# Patient Record
Sex: FEMALE | Race: WHITE | NOT HISPANIC OR LATINO | Employment: OTHER | URBAN - METROPOLITAN AREA
[De-identification: names, ages, dates, MRNs, and addresses within clinical notes are randomized per-mention and may not be internally consistent; named-entity substitution may affect disease eponyms.]

---

## 2023-05-10 LAB
ALPHA-1 FETOPROTEIN (NG/ML) IN SER/PLAS: <4 NG/ML (ref 0–9)
CHOLESTEROL (MG/DL) IN SER/PLAS: 116 MG/DL (ref 0–199)
CHOLESTEROL IN HDL (MG/DL) IN SER/PLAS: 58.4 MG/DL
CHOLESTEROL/HDL RATIO: 2
LDL: 39 MG/DL (ref 0–99)
TRIGLYCERIDE (MG/DL) IN SER/PLAS: 91 MG/DL (ref 0–149)
VLDL: 18 MG/DL (ref 0–40)

## 2023-05-11 LAB
ALBUMIN (MG/L) IN URINE: 8.1 MG/L
ALBUMIN/CREATININE (UG/MG) IN URINE: 10.4 UG/MG CRT (ref 0–30)
AMORPHOUS CRYSTALS, URINE: ABNORMAL /HPF
APPEARANCE, URINE: ABNORMAL
BACTERIA, URINE: ABNORMAL /HPF
BILIRUBIN, URINE: NEGATIVE
BLOOD, URINE: NEGATIVE
CALCIUM OXALATE CRYSTALS, URINE: ABNORMAL /HPF
COLOR, URINE: YELLOW
CREATININE (MG/DL) IN URINE: 78.2 MG/DL (ref 20–320)
GLUCOSE, URINE: NEGATIVE MG/DL
KETONES, URINE: NEGATIVE MG/DL
LEUKOCYTE ESTERASE, URINE: ABNORMAL
MUCUS, URINE: ABNORMAL /LPF
NITRITE, URINE: NEGATIVE
PH, URINE: 6 (ref 5–8)
PROTEIN, URINE: NEGATIVE MG/DL
RBC, URINE: ABNORMAL /HPF (ref 0–5)
SPECIFIC GRAVITY, URINE: 1.01 (ref 1–1.03)
SQUAMOUS EPITHELIAL CELLS, URINE: 4 /HPF
UROBILINOGEN, URINE: 2 MG/DL (ref 0–1.9)
WBC, URINE: ABNORMAL /HPF (ref 0–5)

## 2023-08-24 ENCOUNTER — HOSPITAL ENCOUNTER (OUTPATIENT)
Dept: DATA CONVERSION | Facility: HOSPITAL | Age: 73
End: 2023-08-24

## 2023-08-24 DIAGNOSIS — C7A.8 OTHER MALIGNANT NEUROENDOCRINE TUMORS (MULTI): ICD-10-CM

## 2023-08-30 ENCOUNTER — HOSPITAL ENCOUNTER (OUTPATIENT)
Dept: DATA CONVERSION | Facility: HOSPITAL | Age: 73
Discharge: HOME | End: 2023-08-30
Payer: MEDICARE

## 2023-08-30 DIAGNOSIS — R91.8 OTHER NONSPECIFIC ABNORMAL FINDING OF LUNG FIELD: ICD-10-CM

## 2023-08-30 DIAGNOSIS — C7A.8 OTHER MALIGNANT NEUROENDOCRINE TUMORS (MULTI): ICD-10-CM

## 2023-12-14 ENCOUNTER — APPOINTMENT (OUTPATIENT)
Dept: HEMATOLOGY/ONCOLOGY | Facility: CLINIC | Age: 73
End: 2023-12-14
Payer: MEDICARE

## 2023-12-28 ENCOUNTER — LAB (OUTPATIENT)
Dept: LAB | Facility: LAB | Age: 73
End: 2023-12-28
Payer: MEDICARE

## 2023-12-28 DIAGNOSIS — K74.69 OTHER CIRRHOSIS OF LIVER (MULTI): Primary | ICD-10-CM

## 2023-12-28 LAB
ALBUMIN SERPL-MCNC: 3.2 G/DL (ref 3.5–5)
ALP BLD-CCNC: 159 U/L (ref 35–125)
ALT SERPL-CCNC: 17 U/L (ref 5–40)
ANION GAP SERPL CALC-SCNC: 11 MMOL/L
AST SERPL-CCNC: 28 U/L (ref 5–40)
BILIRUB SERPL-MCNC: 1.9 MG/DL (ref 0.1–1.2)
BUN SERPL-MCNC: 19 MG/DL (ref 8–25)
CALCIUM SERPL-MCNC: 9.8 MG/DL (ref 8.5–10.4)
CHLORIDE SERPL-SCNC: 105 MMOL/L (ref 97–107)
CO2 SERPL-SCNC: 25 MMOL/L (ref 24–31)
CREAT SERPL-MCNC: 0.8 MG/DL (ref 0.4–1.6)
ERYTHROCYTE [DISTWIDTH] IN BLOOD BY AUTOMATED COUNT: 15 % (ref 11.5–14.5)
GFR SERPL CREATININE-BSD FRML MDRD: 77 ML/MIN/1.73M*2
GLUCOSE SERPL-MCNC: 150 MG/DL (ref 65–99)
HCT VFR BLD AUTO: 45.2 % (ref 36–46)
HGB BLD-MCNC: 13.9 G/DL (ref 12–16)
INR PPP: 1.2 (ref 0.9–1.2)
MCH RBC QN AUTO: 30.6 PG (ref 26–34)
MCHC RBC AUTO-ENTMCNC: 30.8 G/DL (ref 32–36)
MCV RBC AUTO: 100 FL (ref 80–100)
NRBC BLD-RTO: 0 /100 WBCS (ref 0–0)
PLATELET # BLD AUTO: 70 X10*3/UL (ref 150–450)
POTASSIUM SERPL-SCNC: 3.5 MMOL/L (ref 3.4–5.1)
PROT SERPL-MCNC: 6.1 G/DL (ref 5.9–7.9)
PROTHROMBIN TIME: 12.2 SECONDS (ref 9.3–12.7)
RBC # BLD AUTO: 4.54 X10*6/UL (ref 4–5.2)
SODIUM SERPL-SCNC: 141 MMOL/L (ref 133–145)
WBC # BLD AUTO: 3.2 X10*3/UL (ref 4.4–11.3)

## 2023-12-28 PROCEDURE — 82105 ALPHA-FETOPROTEIN SERUM: CPT

## 2023-12-28 PROCEDURE — 85027 COMPLETE CBC AUTOMATED: CPT

## 2023-12-28 PROCEDURE — 85610 PROTHROMBIN TIME: CPT

## 2023-12-28 PROCEDURE — 36415 COLL VENOUS BLD VENIPUNCTURE: CPT

## 2023-12-28 PROCEDURE — 80053 COMPREHEN METABOLIC PANEL: CPT

## 2023-12-29 LAB — AFP SERPL-MCNC: <4 NG/ML (ref 0–9)

## 2023-12-31 RX ORDER — TRIAMTERENE AND HYDROCHLOROTHIAZIDE 75; 50 MG/1; MG/1
1 TABLET ORAL
Qty: 90 TABLET | OUTPATIENT
Start: 2023-12-31

## 2024-01-02 PROBLEM — K76.6 PORTAL HYPERTENSION (MULTI): Status: ACTIVE | Noted: 2024-01-02

## 2024-01-02 PROBLEM — R16.1 SPLENOMEGALY: Status: ACTIVE | Noted: 2024-01-02

## 2024-01-02 PROBLEM — M85.80 OSTEOPENIA: Status: ACTIVE | Noted: 2024-01-02

## 2024-01-02 PROBLEM — I10 HTN (HYPERTENSION): Status: ACTIVE | Noted: 2024-01-02

## 2024-01-02 PROBLEM — K74.3 PRIMARY BILIARY CIRRHOSIS (MULTI): Status: ACTIVE | Noted: 2024-01-02

## 2024-01-02 PROBLEM — C7B.09: Status: ACTIVE | Noted: 2024-01-02

## 2024-01-02 PROBLEM — E78.5 HYPERLIPIDEMIA: Status: ACTIVE | Noted: 2024-01-02

## 2024-01-02 NOTE — PROGRESS NOTES
HPI:       Hypertension:          The patient has no issues         The patients cardiovascular risk factors include:         Cardiac Risk Factors  Age > 45-male, > 55-female:  YES  +1   Smoking:   NO   Sig. family hx of CHD*:  YES  +1   Hypertension:   YES  +1   Diabetes:   NO   HDL < 35:   NO   HDL > 59:   NO   Total: 3     *- Sig. family h/o CHD per NCEP = MI or sudden death at <56yo in   father or other 1st-degree male relative, or <64yo in mother or   other 1st-degree female relative           The patients adherence to the treatment regimen is Excellent         Responses to the medications has been Excellent    Hyperlipidemia:   The patient does not use medications that may worsen dyslipidemias (corticosteroids, progestins, anabolic steroids, diuretics, beta-blockers, amiodarone, cyclosporine, olanzapine). The patient exercises intermittently.  The patient is not known to have coexisting coronary artery disease.      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      ferrous sulfate 325 (65 Fe) MG EC tablet once every 24 hours.      ursodiol (Actigall) 500 mg tablet Take 1 tablet (500 mg) by mouth 2 times a day.      vit A/vit C/vit E/zinc/copper (ICAPS AREDS ORAL) once every 24 hours.      potassium chloride CR 10 mEq ER tablet Take 1 tablet (10 mEq) by mouth 2 times a day. 180 tablet 1     triamterene-hydrochlorothiazid (Maxzide) 75-50 mg tablet Take 1 tablet by mouth once daily in the morning. Take before meals. 90 tablet 1     No current facility-administered medications for this visit.     ALLERGIES: No Known Allergies  FAMILY HISTORY:   Family History   Problem Relation Name Age of Onset    Ovarian cancer Mother      Hypertension Mother      Coronary artery disease Father      Hypertension Father      Hypertension Sister      No Known Problems Sister      Hypertension Brother       SOCIAL HISTORY:   Social History     Tobacco Use    Smoking status: Former     Types: Cigarettes     Passive exposure: Past    Smokeless tobacco: Never   Vaping Use    Vaping Use: Never used   Substance Use Topics    Alcohol use: Never         ROS:      CONSTITUTION:  alert and oriented     OPHTHALMOLOGY:          no Change in vision.         CARDIOLOGY:          no Chest pain.  no Dyspnea on exertion.  no Shortness of breath.         ENDOCRINOLOGY:          no Excessive thirst.  no Excessive urination.  no Fatigue.  no Skin changes.  no Weight gain.  no Weight loss.         SKIN:          no Healing problems.         NEUROLOGY:          no Loss of sensation in specific body area.  no Burning pain in feet.  no Peripheral neuropathy.  no Tingling/numbness.    LABS:   Lab Results   Component Value Date    GLUCOSE 150 (H) 12/28/2023    CALCIUM 9.8 12/28/2023     12/28/2023    K 3.5 12/28/2023    CO2 25 12/28/2023     12/28/2023    BUN 19 12/28/2023    CREATININE 0.80 12/28/2023     Lab Results   Component Value Date    CHOL 116 05/10/2023    CHOL 109 (L) 10/24/2022    CHOL 119 (L) 10/14/2021     Lab Results   Component Value Date    HDL 58.4 05/10/2023    HDL 56 10/24/2022    HDL 60 10/14/2021     Lab Results   Component Value Date    LDLCALC 30 (L) 10/24/2022    LDLCALC 40 (L) 10/14/2021    LDLCALC 55 (L) 10/20/2020     Lab Results   Component Value Date    TRIG 91 05/10/2023    TRIG 117 10/24/2022    TRIG 94  "10/14/2021     No components found for: \"CHOLHDL\"  Lab Results   Component Value Date    SHARATH Aguayo 10/24/2022          VITAL SIGNS:  /70 (BP Location: Left arm)   Pulse 73   Wt 95.6 kg (210 lb 12.8 oz)   SpO2 98%   BMI 35.08 kg/m²     General Appearance: awake, alert, oriented, in no acute distress  Lungs: Normal expansion.  Clear to auscultation.  No rales, rhonchi, or wheezing.  Heart: Heart sounds are normal.  Regular rate and rhythm without murmur, gallop or rub.  Extremities: Extremities warm to touch, pink, with no edema.  Neurologic: Alert and oriented x 3, gait normal., reflexes normal and symmetric, strength and  sensation grossly normal    Jessi was seen today for hypertension.  Diagnoses and all orders for this visit:  Primary hypertension (Primary)  -     potassium chloride CR 10 mEq ER tablet; Take 1 tablet (10 mEq) by mouth 2 times a day.  -     triamterene-hydrochlorothiazid (Maxzide) 75-50 mg tablet; Take 1 tablet by mouth once daily in the morning. Take before meals.  -     Follow Up In Primary Care - Established; Future  Mixed hyperlipidemia  Comments:  cont diet and exercise           "

## 2024-01-03 RX ORDER — POTASSIUM CHLORIDE 750 MG/1
10 TABLET, EXTENDED RELEASE ORAL 2 TIMES DAILY
COMMUNITY
Start: 2023-10-17 | End: 2024-01-04 | Stop reason: SDUPTHER

## 2024-01-03 RX ORDER — URSODIOL 500 MG/1
500 TABLET, FILM COATED ORAL 2 TIMES DAILY
COMMUNITY

## 2024-01-03 RX ORDER — FERROUS SULFATE 325(65) MG
TABLET, DELAYED RELEASE (ENTERIC COATED) ORAL EVERY 24 HOURS
COMMUNITY

## 2024-01-03 RX ORDER — VIT C/E/ZN/COPPR/LUTEIN/ZEAXAN 250MG-90MG
1 CAPSULE ORAL EVERY 24 HOURS
COMMUNITY

## 2024-01-03 RX ORDER — TRIAMTERENE AND HYDROCHLOROTHIAZIDE 75; 50 MG/1; MG/1
1 TABLET ORAL
COMMUNITY
End: 2024-01-04 | Stop reason: SDUPTHER

## 2024-01-04 ENCOUNTER — OFFICE VISIT (OUTPATIENT)
Dept: PRIMARY CARE | Facility: CLINIC | Age: 74
End: 2024-01-04
Payer: MEDICARE

## 2024-01-04 VITALS
SYSTOLIC BLOOD PRESSURE: 136 MMHG | HEART RATE: 73 BPM | BODY MASS INDEX: 35.08 KG/M2 | OXYGEN SATURATION: 98 % | DIASTOLIC BLOOD PRESSURE: 70 MMHG | WEIGHT: 210.8 LBS

## 2024-01-04 DIAGNOSIS — E78.2 MIXED HYPERLIPIDEMIA: ICD-10-CM

## 2024-01-04 DIAGNOSIS — I10 PRIMARY HYPERTENSION: Primary | ICD-10-CM

## 2024-01-04 PROCEDURE — 1125F AMNT PAIN NOTED PAIN PRSNT: CPT | Performed by: FAMILY MEDICINE

## 2024-01-04 PROCEDURE — 1159F MED LIST DOCD IN RCRD: CPT | Performed by: FAMILY MEDICINE

## 2024-01-04 PROCEDURE — 3075F SYST BP GE 130 - 139MM HG: CPT | Performed by: FAMILY MEDICINE

## 2024-01-04 PROCEDURE — 1036F TOBACCO NON-USER: CPT | Performed by: FAMILY MEDICINE

## 2024-01-04 PROCEDURE — 3078F DIAST BP <80 MM HG: CPT | Performed by: FAMILY MEDICINE

## 2024-01-04 PROCEDURE — 99214 OFFICE O/P EST MOD 30 MIN: CPT | Performed by: FAMILY MEDICINE

## 2024-01-04 RX ORDER — TRIAMTERENE AND HYDROCHLOROTHIAZIDE 75; 50 MG/1; MG/1
1 TABLET ORAL
Qty: 90 TABLET | Refills: 1 | Status: SHIPPED | OUTPATIENT
Start: 2024-01-04 | End: 2024-07-02

## 2024-01-04 RX ORDER — POTASSIUM CHLORIDE 750 MG/1
10 TABLET, FILM COATED, EXTENDED RELEASE ORAL 2 TIMES DAILY
Qty: 180 TABLET | Refills: 1 | Status: SHIPPED | OUTPATIENT
Start: 2024-01-04 | End: 2024-07-02

## 2024-01-04 ASSESSMENT — PAIN SCALES - GENERAL: PAINLEVEL: 2

## 2024-01-04 ASSESSMENT — PATIENT HEALTH QUESTIONNAIRE - PHQ9: 1. LITTLE INTEREST OR PLEASURE IN DOING THINGS: NOT AT ALL

## 2024-01-04 NOTE — PATIENT INSTRUCTIONS
PAIN MANAGEMENT IN THE ELDERLY  What is pain? Pain is how your body reacts to injury or illness, even as you get older. Pain is not something that normally happens as you age. What you think is painful may not be painful to someone else. Everybody reacts to pain in different ways. But, pain is whatever you say it is! No matter how mild or strong your pain is, you have the right to be “comfortable”. But you need to tell your caregiver that you have pain, so together you can work on treatment options.   The following are some things that are NOT true about getting older and pain.  I should expect to have pain because it is just a part of getting older.  If I tell my caregiver about my pain, he will think I am complaining. You have the right just as anyone else does to be comfortable. Your caregiver cannot treat your pain if you do not tell him about it.  If I tell my caregiver about my pain he will not listen or take me seriously.  If I take pain medicines, I will feel “drugged up” or “doped up”. There are many new medicines now that lessen pain without making you feel “drugged up” when taking them.  Older people should only take pain medicines if their pain is very bad. You have the right to be comfortable no matter if you have a little or a lot of pain.  Pain medicines are addictive and if I take them I will get “hooked”. An addicted person is someone who takes medicines to “get high”. You cannot get addicted to pain medicines if you are taking them to make your pain go away. A person who has diabetes takes their medicine to keep the diabetes I control. It is the same with your pain. You take pain medicines to keep your pain in control and to live a normal life.  What causes pain? Pain can be caused by many things, such as injury, surgery, or disease. Some pain is caused by pressure on a nerve, such as a cancerous tumor. Other pain is caused when nerves are cut as I an accident or surgery. Old injuries that may not  have bothered you may get re?injured, or cause new injuries. You may not want to move the painful part of your body at all. But, you may have pain because you are not moving this body part. But sometimes there is no clear cause for pain.    What are the different types of pain?  Acute pain is short?lived and usually lasts less than 3 months. Caregivers first work to remove the cause of the pain, such fixing a broken arm or leg. Acute pain can usually be controlled or stopped with pain medicine.  Chronic pain lasts longer than 3 to 6 months. Almost 4 out of 5 persons over 65 regularly take medications for chronic pain, and half of this group has seen more than 3 doctors in the past 5 years. This kind of pain is often more complex. Caregivers may use medicines along with other treatments, like physical and relaxation therapies to help your pain.    What is your pain like? Caregivers want you to talk to them about your pain. This helps them learn what may be causing the pain and how to best treat it. You need to tell your caregivers if you have trouble hearing their questions or seeing things. Caregivers can use special tools and ways to better understand their questions about your pain.    What if I cannot talk? Sometimes you may not be able to speak about your pain. You may have illness or injuries like dementia, brain damage, or a stroke. This makes it very hard for your caregivers and family to know you are in pain. Your family may help caregivers understand your pain by watching for physical signs of pain. This means you may act normal or opposite of how your family thinks you should act even though you are having very bad pain.     The following are some signs that your family can watch for that may tell them you are in pain.  If you are normally loud and noisy, you may get very quiet and withdrawn. You may also stop doing activities you used to do.  If you are very quiet and withdrawn, you may get loud, act  stubborn, and hit people.  You may not eat what you normally do. Or, you may only want to drink or eat soft foods.  Suddenly, you may not do all the activities you used to do.  You may lose control of your bowel or bladder.  You may be very depressed and have a sad face.  If you do not talk at all, you may blink your eyes very fast much of the time. You may also grimace.  If you have been very easy going and happy, you may begin to be very sensitive and cry easily.  You might start to walk or move differently than before. You may suddenly stop walking or start pacing all the time.  You may have your knees drawn up to your chest and rock like a baby.  You may touch, rub, pull or pick at a body part that is hurting.  You may start to pull away from peoples’ touch and protect your arms and legs.  You may suddenly begin to fall when you had no problems before.  You may sleep more than usual.  You may start to whimper or groan quietly.  You may become very confused suddenly when there was no problem before.  Why is pain control important? Pain can affect your appetite, how well you sleep, your energy level and ability to do things. Pain can also affect your mood and relationship with others. If caregivers can help you control your pain, you will suffer less and can even heal faster.  Medicines:  Anti?anxiety medicine: this medicine may be given to help you feel less nervous. It may be given by IV, as a shot/injection or by mouth.  Anti?convulsing: this medicine is given to control seizures. It can also be used to treat kinds of chronic nerve pain and may help control your mood swings. It is given by IV, shot/injection or by mouth.  Muscle relaxers: you may need medicine to help your muscles relax. This medicine can be given by IV, shot/injection or by mouth. Muscle relaxers can make you feel dizzy or weak. Call your caregiver if you need help getting out of bed.  NSAIDS: this medicine may be given to decrease  inflammation, which is redness, pain and swelling. It is very good for chronic bone pain that comes from arthritis or cancer. It is given by mouth or inhaled in your nose.  Pain medicine: this medicine affects the nervous system so you feel less pain. Your caregiver will tell you how much to take and how often. Take the medicine regularly as directed by your  caregiver. Do not wait until the pain is too bad. The medicine may not work as well at controlling the pain if you wait too long to take it. Tell caregivers if the pain does not go away or comes back.  Steroids: this medicine may be given to decrease inflammation. There are many different reasons to take steroids. This medication can help a lot but may also have side effects. Be sure  you understand why you need steroids. Don’t stop taking this medicine without your caregiver’s ok. Stopping on your own can cause problems.  Tell your caregiver all medications that you are taking, including over the counter meds and herbals.    How can pain medicine be given? The following are the many different ways pain medicine can be given depending on the kind of pain you are having.  By mouth - you may be given pills or liquid to swallow or you may be given a pill or liquid to put under your tongue. Some medicine can also be given as a lozenge like a cough drop or even as a special lollipop.  Rectal - medicine in a suppository is put into your rectum.  IV - pain medicine can be given as a shot/injection in an IV, into a muscle, or under the skin.  Transdermal - some medicine can be given as a patch that is placed on your skin. This medicine is released slowly to give pain relief for as long as 72 hours.  How can you take pain medicine safely and make it work best for you?  DO NOT wait until you are in pain to take your medicine if your caregiver has suggested a regular schedule around the clock. If you wait until you feel pain, you will only be “chasing” your pain and not  controlling it.  Some pain medicines can make you breathe less deeply and less often. The medicine may also make you sleepy, dizzy, and unsafe to drive a car or use heavy equipment. For these reasons, it is very important to follow your caregiver’s advice on how to use this medicine.  Some foods, alcohol and other medicines may cause unpleasant side effects when you take pain medicine. Follow your caregiver’s advice about how to prevent these problems.  Pain medicine and NSAIDS can make you constipated. Contact your caregiver if you are experiencing constipation.  Do not stop talking pain medicine suddenly if you have been taking it longer than 2 weeks. Your body may have become used to the medicine. Stopping the medicine all at once may cause unpleasant or dangerous side effects.  With time you may feel that the pain medicine is not working as well as it did before. Call your caregiver if this happens. Together you can find new ways to control the pain.    Other non?drug control methods: there are many pain control techniques that can help you deal with pain even if it does not go away completely. It is important to practice some of the techniques whe you do not have pain if possible. This will help the technique work well during an attack of pain.  Breathing exercises.  Environment: being in a quiet place may make it easier for you to deal with the pain. Avoiding bright lights or loud, noisy places can also help control the pain. Making sure your home is not too hot or too cold may also lessen pain.

## 2024-01-13 DIAGNOSIS — I10 PRIMARY HYPERTENSION: ICD-10-CM

## 2024-01-13 RX ORDER — POTASSIUM CHLORIDE 750 MG/1
10 TABLET, EXTENDED RELEASE ORAL 2 TIMES DAILY
Qty: 180 TABLET | Refills: 1 | OUTPATIENT
Start: 2024-01-13

## 2024-04-23 ENCOUNTER — PREP FOR PROCEDURE (OUTPATIENT)
Dept: OBSTETRICS AND GYNECOLOGY | Facility: CLINIC | Age: 74
End: 2024-04-23

## 2024-04-23 ENCOUNTER — OFFICE VISIT (OUTPATIENT)
Dept: OBSTETRICS AND GYNECOLOGY | Facility: CLINIC | Age: 74
End: 2024-04-23
Payer: MEDICARE

## 2024-04-23 VITALS
SYSTOLIC BLOOD PRESSURE: 134 MMHG | DIASTOLIC BLOOD PRESSURE: 78 MMHG | WEIGHT: 209 LBS | BODY MASS INDEX: 34.82 KG/M2 | HEIGHT: 65 IN

## 2024-04-23 DIAGNOSIS — N81.4 UTEROVAGINAL PROLAPSE: Primary | ICD-10-CM

## 2024-04-23 DIAGNOSIS — N39.3 SUI (STRESS URINARY INCONTINENCE, FEMALE): ICD-10-CM

## 2024-04-23 DIAGNOSIS — K62.3 RECTAL PROLAPSE: ICD-10-CM

## 2024-04-23 DIAGNOSIS — Z01.818 PREOP TESTING: ICD-10-CM

## 2024-04-23 DIAGNOSIS — N39.46 MIXED STRESS AND URGE INCONTINENCE: ICD-10-CM

## 2024-04-23 DIAGNOSIS — N81.4 UTERINE PROLAPSE: ICD-10-CM

## 2024-04-23 LAB
POC APPEARANCE, URINE: ABNORMAL
POC BILIRUBIN, URINE: NEGATIVE
POC BLOOD, URINE: NEGATIVE
POC COLOR, URINE: YELLOW
POC GLUCOSE, URINE: NEGATIVE MG/DL
POC KETONES, URINE: NEGATIVE MG/DL
POC LEUKOCYTES, URINE: NEGATIVE
POC NITRITE,URINE: NEGATIVE
POC PH, URINE: 7.5 PH
POC PROTEIN, URINE: NEGATIVE MG/DL
POC SPECIFIC GRAVITY, URINE: 1.01
POC UROBILINOGEN, URINE: 0.2 EU/DL

## 2024-04-23 PROCEDURE — 51701 INSERT BLADDER CATHETER: CPT | Performed by: OBSTETRICS & GYNECOLOGY

## 2024-04-23 PROCEDURE — 81003 URINALYSIS AUTO W/O SCOPE: CPT | Performed by: OBSTETRICS & GYNECOLOGY

## 2024-04-23 PROCEDURE — 3078F DIAST BP <80 MM HG: CPT | Performed by: OBSTETRICS & GYNECOLOGY

## 2024-04-23 PROCEDURE — 99205 OFFICE O/P NEW HI 60 MIN: CPT | Performed by: OBSTETRICS & GYNECOLOGY

## 2024-04-23 PROCEDURE — 1159F MED LIST DOCD IN RCRD: CPT | Performed by: OBSTETRICS & GYNECOLOGY

## 2024-04-23 PROCEDURE — 3075F SYST BP GE 130 - 139MM HG: CPT | Performed by: OBSTETRICS & GYNECOLOGY

## 2024-04-23 RX ORDER — VIT A/VIT C/VIT E/ZINC/COPPER 4296-226
CAPSULE ORAL EVERY 24 HOURS
COMMUNITY
End: 2024-05-02 | Stop reason: WASHOUT

## 2024-04-23 RX ORDER — HEPARIN SODIUM 5000 [USP'U]/ML
5000 INJECTION, SOLUTION INTRAVENOUS; SUBCUTANEOUS ONCE
Status: CANCELLED | OUTPATIENT
Start: 2024-04-23 | End: 2024-04-23

## 2024-04-23 RX ORDER — CEFAZOLIN SODIUM 2 G/100ML
2 INJECTION, SOLUTION INTRAVENOUS ONCE
Status: CANCELLED | OUTPATIENT
Start: 2024-04-23 | End: 2024-04-23

## 2024-04-23 RX ORDER — PHENAZOPYRIDINE HYDROCHLORIDE 200 MG/1
200 TABLET, FILM COATED ORAL ONCE
Status: CANCELLED | OUTPATIENT
Start: 2024-04-23 | End: 2024-04-23

## 2024-04-23 RX ORDER — SODIUM CHLORIDE, SODIUM LACTATE, POTASSIUM CHLORIDE, CALCIUM CHLORIDE 600; 310; 30; 20 MG/100ML; MG/100ML; MG/100ML; MG/100ML
100 INJECTION, SOLUTION INTRAVENOUS CONTINUOUS
Status: CANCELLED | OUTPATIENT
Start: 2024-04-23

## 2024-04-23 RX ORDER — ACETAMINOPHEN 325 MG/1
975 TABLET ORAL ONCE
Status: CANCELLED | OUTPATIENT
Start: 2024-04-23 | End: 2024-04-23

## 2024-04-23 RX ORDER — CELECOXIB 400 MG/1
400 CAPSULE ORAL ONCE
Status: CANCELLED | OUTPATIENT
Start: 2024-04-23 | End: 2024-04-23

## 2024-04-23 ASSESSMENT — ENCOUNTER SYMPTOMS
PSYCHIATRIC NEGATIVE: 1
NEUROLOGICAL NEGATIVE: 1
CONSTITUTIONAL NEGATIVE: 1
RESPIRATORY NEGATIVE: 1
ANAL BLEEDING: 1
CARDIOVASCULAR NEGATIVE: 1
EYES NEGATIVE: 1
CONSTIPATION: 1
MUSCULOSKELETAL NEGATIVE: 1
ENDOCRINE NEGATIVE: 1

## 2024-04-23 NOTE — PATIENT INSTRUCTIONS
She does not desire future coital function and therefore we discussed an obliterative procedure with LeFort colpocleisis and ultra-lateral perineorrhaphy versus ultra-lateral anterior and posterior repairs as indicated by surgical findings (90-95% success, <10% reoperation rate). She understands that following the surgery the vagina will be shortened and narrowed to a point that she will be unable to have conventional penetrative intercourse.     She also understands that her uterus will be left in place but difficult to access. There is a small risk of future development of uterine cancer and this procedure could complicate that assessment. She does / does not have a history of postmenopausal bleeding and does/ does not need additional evaluation of the uterus prior to surgery.

## 2024-04-23 NOTE — PROGRESS NOTES
[facility] Department of Urogynecology   Nichol Jackson MD, MPH   [phone]    ASSESSMENT AND PLAN:   74 year old female with rectal prolapse, stage 4 uterovaginal prolapse, and MARI with urge and stress. Comorbidities include: HTN, hx of lung cancer, biliary cirrhosis, cytopenia, and h/o splenomegaly.     Diagnoses:  #1 Uterovaginal prolapse, stage 4  #2 Rectal prolapse  #3 Mixed urinary incontinence, urge and stress    Plan:  1. Uterovaginal prolapse, stage 4  - PVR = 5mL by straight catheterization  - Upon exam her Pop-Q demonstrated Aa: +1, Ba: +2, C: +5, gH: 6, pB: 4, TVL: 10, Ap: -2, Bp: -2, and D: -5 indicating stage III-IV uterovaginal prolapse.   - Reviewed risk factors, natural history and etiology of prolapse.   - Discussed management options for prolapse including expectant management, pessary fitting, and surgery.   - We discussed the benefits of fitting her with a pessary which is a small flexible silicone ring that is placed intravaginally to help support the pelvic organs to prevent the symptomatic vaginal bulge. There is no increased risk of UTI or vaginal infections with using a pessary. She may learn how to manage the pessary at home on her own with taking the pessary in/out prior to intercourse and recommended maintenance at least every 2 weeks or she may return to clinic every 3 months for us to perform her pessary maintenance.   > She has previously tried a ring pessary that was fitted by Dr. Campbell and the pessary fell out.   > Declines a pessary refitting.   - We discussed there are different POP repair surgical approaches to consider given that is not interested in maintaining ability to be sexually active in the future such as the colpocleisis which is a vaginal closure procedure that involves suturing the front and back walls of the vagina. However, alternative more invasive POP repair options include laparoscopic abdominal surgery that involves placing polypropylene mesh  (i.e. SCP + ESTEVAN) or a native tissue repair vaginal approach that does not involve mesh but rather uses sutures with the intent of the body to scar over to suspend the pelvic organs that can be performed with or without a hysterectomy (i.e. SSLS +/- TVH and APR). For either surgery, it involves 6 weeks of postoperative pelvic rest and no heavy lifting > 5-10 pounds.   > She is interested in pursuing the colpocleisis and we gave her PI handout on this to review the literature.   - We counseled on the risks, surgical steps, benefits, expected outcomes, and postoperative recovery of the LeFort colpocleisis. The colpocleisis is suturing the front and back walls of the vagina together to prevent the prolapse from bulging out of the vagina past the introitus but we would leave two tracts from the cervix down the lateral sides of the vagina in case she were to ever develop vaginal bleeding/PMB in the future she would be symptomatic and we would able to evaluate it. She would not need a hysterectomy but we would order a pelvic U/S prior to the colpocleisis to evaluate the uterus prior to surgery. General risks of surgery include bleeding, infection, and damage to surrounding organs (i.e., bowel, bladder, ureters, and associated vasculature) if anything is damaged during surgery we will address it at that time. We specifically discussed that her anatomy will remain the same and her ability to defecate/urinate will remain intact but she will have a shortened vaginal length and will be unable to have penetrative intercourse after this procedure in the future. The benefits of this procedure is that it is quick with less time under anesthesia, less risks of damage to surrounding organs as we will not be operating abdominally, very low risk of infection and bleeding, and this procedure has a high success rate of around 95%.  - Ordered pelvic U/S to evaluate uterus and endometrial thickness prior to LeFort colpocleisis.  - Will  need PAT and possible oncology + hematology clearance given her blood clotting disorder and hx of lung cancer.   - Case request sent for LeFort colpocleisis, VA/perineorrhaphy, possible TVT or Bulkamid, and cystoscopy with Dr. Nichol Jackson @ [facility] [place].     2. MARI with urge and stress  - We discussed that in order to correctly treat her urinary issues she will need UDS to fully assess/diagnose the type of urinary problems she is having (i.e., LUMA vs. ISD vs. OAB). Urodynamics assesses bladder function and capacity, voiding/filling patterns, urethral strength, assess if there is PHAM, MUCP, etc.   - Counseled on the importance of UDS to evaluate if there will be occult LUMA after POP repair and if there is we would offer her an anti-incontinence procedure at the time of her POP repair (i.e. midurethral sling or Bulkamid).     3. Rectal prolapse, rectal bleeding   - Upon rectal exam today there was a 1cm of circumferential rectal prolapse and hemorrhoids that easily began to bleed when lightly palpated.   - Placed referral to colo/rectal for further management of rectal prolapse.     Follow up after UDS with Dr. Nichol Jackson for a phone call visit to discuss UDS results to finalize the surgical plan.     Scribe Attestation  By signing my name below, I, Ana Bradford, attest that this documentation has been prepared under the direction and in the presence of Nichol Jackson MD MPH on 04/23/2024 at 3:58 PM.     Problem List Items Addressed This Visit    None  Visit Diagnoses       Uterovaginal prolapse    -  Primary    Uterine prolapse        Relevant Orders    POCT UA Automated manually resulted (Completed)    Uroflowmetry    Preop testing        Relevant Orders    Uroflowmetry    US PELVIS TRANSABDOMINAL WITH TRANSVAGINAL    Mixed stress and urge incontinence        Relevant Orders    Uroflowmetry    Rectal prolapse        Relevant Orders    Referral to Colorectal Surgery           I spent a total of 60 minutes  in face to face and non face to face time.      Nichol Jackson MD, MPH, FACOG     I Dr. Jackson, personally performed the services described in the documentation as scribed in my presence and confirm it is both complete and accurate.  Nichol Jackson MD, MPH, FACOG      New    HISTORY OF PRESENT ILLNESS:   74 year old female presenting as a new patient referral from Dr. Campbell for evaluation of uterine prolapse and urinary incontinence.     Prolapse Symptoms:  - The patient reports new onset of feeling a vaginal bulge that began recently within the past x1 year where she noticed it with moving when picking up heavy boxes.   - She notices a vaginal bulge when she lifts heavy items.   - Her POP is large in size and comes past the vaginal introitus.      Urinary Symptoms:   - Endorses LUMA with lifting heavy objects and coughing.  - She reports wearing pads for insensory urinary incontinence when she notices a wet/saturated pad she changes her pad around 4-5x per day. Of note, insensory urine loss began around x4 weeks ago.   - Denies feeling of incomplete bladder emptying.   - Nocturia 2x/night and this does not negatively impact her sleep quality or cause insomnia/difficulty getting back to sleep.   - Voids every 2-3 hours during the day. Of note, she changes her pad with each void due to the pad being damp with urine.   - Does endorse occasional UUI with leaking on her way to the bathroom with associated urgency.   - No hx of Donna or pyelonephritis.   - Drinks 3 cups of water that she sips throughout the day, 1 small cup of tea in the morning, and 1 Diet Dr. Pepper (12oz) can per day.     Bowel Symptoms:   - Has a BM once every morning typically and stool consistency is soft/easy to pass after initial hard stool.   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   - Does endorse occasional rectal pain with bowel movements and states she bleeds easily due to her DHARMESH/autoimmune cirrhosis. She states she has a blood disorder described as having prolonged clotting times/thin blood discovered when she was a child. She no longer sees hematology.   - No FI or ABL episodes at baseline.     OBGYN History and Sexual Activity:   - , x4   - Does have a history of OASI with her first delivery.   - Denies having any PPH or excessive bleeding with any of her deliveries that required RBC transfusion.   - Menopause at age 38 or 39 and denies PMB.   - Not currently sexually active with her  due to male factor issues. She is not interested in becoming sexually active again in the future.        Past Medical History:     - HTN, hx of lung cancer with previous hx of smoking (quit > 10  years ago), biliary cirrhosis, cytopenia, and h/o splenomegaly.   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)      Espinoza; observation yearly; stable since 2017    Osteopenia     Portal hypertension (Multi)     Ocampo    Primary biliary cirrhosis (Multi)     Ocampo    Splenomegaly     Ocampo    Thyroid nodule     Shayla: benign bx's x 2       Past Surgical History:     Past Surgical History:   Procedure Laterality Date    CATARACT EXTRACTION Left 10/2022    COLONOSCOPY  2021    + polyps/nelida 5 yrs; [facility]    CT GUIDED PERCUTANEOUS BIOPSY LUNG  05/10/2021    + cancerous/carcinoid  Jared/Pete    ESOPHAGOGASTRODUODENOSCOPY  2021    eso varices; portal htn gastropathy; Ocampo    US GUIDED PERCUTANEOUS PLACEMENT  10/26/2018    US GUIDED PERCUTANEOUS PLACEMENT [facility]       Medications:     Prior to  "Admission medications    Medication Sig Start Date End Date Taking? Authorizing Provider   cholecalciferol (Vitamin D3) 25 MCG (1000 UT) capsule 1 capsule (25 mcg) once every 24 hours.    Historical Provider, MD   ferrous sulfate 325 (65 Fe) MG EC tablet once every 24 hours.    Historical Provider, MD   potassium chloride CR 10 mEq ER tablet Take 1 tablet (10 mEq) by mouth 2 times a day. 1/4/24 7/2/24  Angeles Whittaker MD   triamterene-hydrochlorothiazid (Maxzide) 75-50 mg tablet Take 1 tablet by mouth once daily in the morning. Take before meals. 1/4/24 7/2/24  Angeles Whittaker MD   ursodiol (Actigall) 500 mg tablet Take 1 tablet (500 mg) by mouth 2 times a day.    Historical Provider, MD   vit A/vit C/vit E/zinc/copper (ICAPS AREDS ORAL) once every 24 hours.    Historical Provider, MD   vitamins A,C,E-zinc-copper (ICaps AREDS) 4,296 mcg-226 mg-90 mg capsule once every 24 hours.    Historical Provider, MD        ROS  Review of Systems   Constitutional: Negative.    HENT: Negative.     Eyes: Negative.    Respiratory: Negative.     Cardiovascular: Negative.    Gastrointestinal:  Positive for anal bleeding and constipation.   Endocrine: Negative.    Genitourinary:  Positive for enuresis and urgency.   Musculoskeletal: Negative.    Neurological: Negative.    Psychiatric/Behavioral: Negative.            PHYSICAL EXAM:    /78   Ht 1.651 m (5' 5\")   Wt 94.8 kg (209 lb)   BMI 34.78 kg/m²   No LMP recorded. Patient is postmenopausal.    PVR = 5mL by straight catheterization     Declines chaperone for physical exam.      Well developed, well nourished, in no apparent distress.   Neurologic/Psychiatric:  Awake, Alert and Oriented times 3.  Affect normal.     GENITAL/URINARY:     External Genitalia:  The patient has normal appearing external genitalia, normal skenes and bartholins glands, and a normal hair distribution.  Her vulva is without lesions, erythema or discharge.  It is non-tender with appropriate sensation. "     Urethral Meatus:  Size normal, Location normal, Lesions absent, Prolapse absent.    Urethra:  Fullness absent, Masses absent. Negative CST in the spine position.     Bladder:  Fullness absent, Masses absent, Tenderness absent.    Vagina:  General appearance normal, Estrogen effect normal, Discharge absent, Lesions absent. Normal vaginal epithelium, hypoestrogenic.     Cervix: Normal, no discharge. Small cervical polyp, not actively bleeding.   Uterus:  normal size, mobile, and nontender    Anus/Perineum:  Normal perineum. Positive Dovetail sign, minimal squeeze.     Stress urinary incontinence not demonstrable.       Physical Exam  Genitourinary:      Vulva, bladder and urethral meatus normal.      Genitourinary Comments: There is around 1cm of circumferential rectal prolapse and hemorrhoids.       No vaginal discharge.      Anterior and apical vaginal prolapse present.     Mild vaginal atrophy present.       Right Adnexa: not tender and no mass present.     Left Adnexa: not tender and no mass present.     Cervical polyp present.      No cervical discharge.      Cervical exam comments: Small cervical polyp, not actively bleeding. .      Uterus is not enlarged, fixed or tender.      No urethral tenderness, mass or stress urinary incontinence with cough stress test present.   POP-Q measurements were:      Aa: +1, Ba: +5, C: +5     gH: 6, pB: 4, TVL: 10     Ap: -2, Bp: -2, D: -5     Pelvic exam was performed with patient in the lithotomy position.   Rectum:      External hemorrhoid present.      Rectal exam comments: Rectal prolapse is notable. .       She does not have myofascial tenderness on exam.   Her highest pain score on exam is 1     Rectal exam: There is around 1cm of circumferential rectal prolapse and hemorrhoids.       Data and DIAGNOSTIC STUDIES REVIEWED   Lab Results   Component Value Date    GLUCOSE 150 (H) 12/28/2023    CALCIUM 9.8 12/28/2023     12/28/2023    K 3.5 12/28/2023    CO2 25  12/28/2023     12/28/2023    BUN 19 12/28/2023    CREATININE 0.80 12/28/2023     Lab Results   Component Value Date    WBC 3.2 (L) 12/28/2023    HGB 13.9 12/28/2023    HCT 45.2 12/28/2023     12/28/2023    PLT 70 (L) 12/28/2023

## 2024-04-25 ENCOUNTER — PROCEDURE VISIT (OUTPATIENT)
Dept: OBSTETRICS AND GYNECOLOGY | Facility: CLINIC | Age: 74
End: 2024-04-25
Payer: MEDICARE

## 2024-04-25 DIAGNOSIS — N39.46 MIXED STRESS AND URGE INCONTINENCE: ICD-10-CM

## 2024-04-25 PROCEDURE — 51741 ELECTRO-UROFLOWMETRY FIRST: CPT | Performed by: OBSTETRICS & GYNECOLOGY

## 2024-04-25 PROCEDURE — 51797 INTRAABDOMINAL PRESSURE TEST: CPT | Performed by: OBSTETRICS & GYNECOLOGY

## 2024-04-25 PROCEDURE — 51729 CYSTOMETROGRAM W/VP&UP: CPT | Performed by: OBSTETRICS & GYNECOLOGY

## 2024-04-25 PROCEDURE — 51784 ANAL/URINARY MUSCLE STUDY: CPT | Performed by: OBSTETRICS & GYNECOLOGY

## 2024-04-25 PROCEDURE — 51798 US URINE CAPACITY MEASURE: CPT | Performed by: OBSTETRICS & GYNECOLOGY

## 2024-04-26 ENCOUNTER — HOSPITAL ENCOUNTER (OUTPATIENT)
Facility: HOSPITAL | Age: 74
Setting detail: OUTPATIENT SURGERY
End: 2024-04-26
Attending: OBSTETRICS & GYNECOLOGY | Admitting: OBSTETRICS & GYNECOLOGY
Payer: MEDICARE

## 2024-04-26 PROBLEM — N39.3 SUI (STRESS URINARY INCONTINENCE, FEMALE): Status: ACTIVE | Noted: 2024-04-23

## 2024-04-26 PROBLEM — N81.4 UTEROVAGINAL PROLAPSE: Status: ACTIVE | Noted: 2024-04-23

## 2024-04-26 NOTE — PROGRESS NOTES
Patient ID: Jessi Hallman is a 74 y.o. female.    Uroflowmetry    Date/Time: 4/25/2024 9:44 AM    Performed by: Kellie Carpenter MA  Authorized by: Nichol Jackson MD MPH    Procedure discussed: discussed risks, benefits and alternatives    Chaperone present: no    Timeout: timeout called immediately prior to procedure    Prep: patient was prepped and draped in usual sterile fashion    Position: sitting    Procedure Details     Procedure: CMG with UPP/voiding pressures, EMG, intra-abdominal voiding study and uroflow      CMG with UPP/Voiding Pressures Details:     First urge to void (mL): 123    Urgency to void (mL): 181    Bladder capacity (mL): 260    Uroflow Details:     Pre-void volume (mL): 124.5    Voiding duration (sec): 29.6    Average flow rate (mL/sec): 4.2    Max flow rate (mL/sec): 10.7    Voided urine (mL): 273    Residual urine (mL): 0    Post-Procedure Details     Outcome: patient tolerated procedure well with no complications      Additional Details      + stress incontinence         Urodynamics Evaluation Documentation      HPI/Indication for UDS: 74 year old female with rectal prolapse, stage 4 uterovaginal prolapse, and MARI with urge and stress. Comorbidities include: HTN, hx of lung cancer, biliary cirrhosis, cytopenia, and h/o splenomegaly.      Diagnoses:  #1 Uterovaginal prolapse, stage 4  #2 Rectal prolapse  #3 Mixed urinary incontinence, urge and stress     Plan:  1. Uterovaginal prolapse, stage 4  - PVR = 5mL by straight catheterization  - Upon exam her Pop-Q demonstrated Aa: +1, Ba: +2, C: +5, gH: 6, pB: 4, TVL: 10, Ap: -2, Bp: -2, and D: -5 indicating stage III-IV uterovaginal prolapse.   - Reviewed risk factors, natural history and etiology of prolapse.   - Discussed management options for prolapse including expectant management, pessary fitting, and surgery.   - We discussed the benefits of fitting her with a pessary which is a small flexible silicone ring that is placed intravaginally  to help support the pelvic organs to prevent the symptomatic vaginal bulge. There is no increased risk of UTI or vaginal infections with using a pessary. She may learn how to manage the pessary at home on her own with taking the pessary in/out prior to intercourse and recommended maintenance at least every 2 weeks or she may return to clinic every 3 months for us to perform her pessary maintenance.   > She has previously tried a ring pessary that was fitted by Dr. Campbell and the pessary fell out.   > Declines a pessary refitting.   - We discussed there are different POP repair surgical approaches to consider given that is not interested in maintaining ability to be sexually active in the future such as the colpocleisis which is a vaginal closure procedure that involves suturing the front and back walls of the vagina. However, alternative more invasive POP repair options include laparoscopic abdominal surgery that involves placing polypropylene mesh (i.e. SCP + ESTEVAN) or a native tissue repair vaginal approach that does not involve mesh but rather uses sutures with the intent of the body to scar over to suspend the pelvic organs that can be performed with or without a hysterectomy (i.e. SSLS +/- TVH and APR). For either surgery, it involves 6 weeks of postoperative pelvic rest and no heavy lifting > 5-10 pounds.   > She is interested in pursuing the colpocleisis and we gave her PI handout on this to review the literature.   - We counseled on the risks, surgical steps, benefits, expected outcomes, and postoperative recovery of the LeFort colpocleisis. The colpocleisis is suturing the front and back walls of the vagina together to prevent the prolapse from bulging out of the vagina past the introitus but we would leave two tracts from the cervix down the lateral sides of the vagina in case she were to ever develop vaginal bleeding/PMB in the future she would be symptomatic and we would able to evaluate it. She would not  need a hysterectomy but we would order a pelvic U/S prior to the colpocleisis to evaluate the uterus prior to surgery. General risks of surgery include bleeding, infection, and damage to surrounding organs (i.e., bowel, bladder, ureters, and associated vasculature) if anything is damaged during surgery we will address it at that time. We specifically discussed that her anatomy will remain the same and her ability to defecate/urinate will remain intact but she will have a shortened vaginal length and will be unable to have penetrative intercourse after this procedure in the future. The benefits of this procedure is that it is quick with less time under anesthesia, less risks of damage to surrounding organs as we will not be operating abdominally, very low risk of infection and bleeding, and this procedure has a high success rate of around 95%.  - Ordered pelvic U/S to evaluate uterus and endometrial thickness prior to LeFort colpocleisis.  - Will need PAT and possible oncology + hematology clearance given her blood clotting disorder and hx of lung cancer.   - Case request sent for LeFort colpocleisis, NE/perineorrhaphy, possible TVT or Bulkamid, and cystoscopy with Dr. Nichol Jackson @ [facility].      2. MARI with urge and stress  - We discussed that in order to correctly treat her urinary issues she will need UDS to fully assess/diagnose the type of urinary problems she is having (i.e., LUMA vs. ISD vs. OAB). Urodynamics assesses bladder function and capacity, voiding/filling patterns, urethral strength, assess if there is PHAM, MUCP, etc.   - Counseled on the importance of UDS to evaluate if there will be occult LUMA after POP repair and if there is we would offer her an anti-incontinence procedure at the time of her POP repair (i.e. midurethral sling or Bulkamid).      3. Rectal prolapse, rectal bleeding   - Upon rectal exam today there was a 1cm of circumferential rectal prolapse and hemorrhoids that easily began to  bleed when lightly palpated.   - Placed referral to colo/rectal for further management of rectal prolapse.     UDS  Calibrated electronic equipment was used throughout testing.    60197 Complex Urinary Flow Study:   Maximum flow: 10.7 ml/s  Voiding time: of 32 sec   Voided volume of 124 cc.    33669 PVR by ultrasound: 20 ml    37467 Complex cystometrogram with  and UPP-Simultaneous measurement of intraabdominal pressure using a vaginal/rectal catheter and bladder pressure using a urethral catheter. Cystometry was done.    Fill rate: 100 ml/min  First sensation occurred at 123 ml at a detrusor pressure of 3 cm H2O   Strong desire to void occurred at 181 ml with a detrusor pressure of 3 cm H2O.   Maximum capacity of 260 ml with a detrusor pressure of 3 cm H2O.  DO: none seen  LMUA: LPP = 115 at volume of 182 ml    Urethral Pressure Profile:   MUCP was 40 cm of water  Functional urethral length of 2 cm.     Pressure Flow Study 45898 Voiding pressure studies ()? intraabdominal voiding pressure (AP)  A voiding pressure study was completed and performed utilizing both bladder and rectal catheters for detrusor, vesical, and abdominal pressure measurement.    Maximum flow rate: 16 ml/s  Average flow rate: 6 ml/s  Pdet at max flow: 36 cm H2)  Maximum detrusor pressure of 54 cm H2O     83452 EMG of anal or urethral sphincter, other than needle, any technique  Electromyography: Provocative maneuvers produced appropriate  changes in waveforms. The EMG shows increased EMG activity with increased intraabdominal pressure. There was a decrease in the EMG activity during voiding consistent with normal function of the pelvic floor.    Summary  Normal capacity, compliance, sensation. + LUMA, no DO. Voids via detrusor mechanism. No PHAM    Recommend sling at time of colpocleisis    Nichol Jackson MD MPH

## 2024-05-02 ENCOUNTER — LAB (OUTPATIENT)
Dept: LAB | Facility: LAB | Age: 74
End: 2024-05-02
Payer: MEDICARE

## 2024-05-02 ENCOUNTER — TELEPHONE (OUTPATIENT)
Dept: OBSTETRICS AND GYNECOLOGY | Facility: CLINIC | Age: 74
End: 2024-05-02
Payer: MEDICARE

## 2024-05-02 ENCOUNTER — PRE-ADMISSION TESTING (OUTPATIENT)
Dept: PREADMISSION TESTING | Facility: HOSPITAL | Age: 74
End: 2024-05-02
Payer: MEDICARE

## 2024-05-02 VITALS
BODY MASS INDEX: 35.64 KG/M2 | SYSTOLIC BLOOD PRESSURE: 147 MMHG | DIASTOLIC BLOOD PRESSURE: 66 MMHG | WEIGHT: 213.9 LBS | HEART RATE: 74 BPM | RESPIRATION RATE: 18 BRPM | TEMPERATURE: 98.2 F | OXYGEN SATURATION: 99 % | HEIGHT: 65 IN

## 2024-05-02 DIAGNOSIS — N81.4 UTEROVAGINAL PROLAPSE: ICD-10-CM

## 2024-05-02 DIAGNOSIS — Z01.818 PREOP TESTING: ICD-10-CM

## 2024-05-02 DIAGNOSIS — N39.3 SUI (STRESS URINARY INCONTINENCE, FEMALE): ICD-10-CM

## 2024-05-02 DIAGNOSIS — Z41.9 SURGERY, ELECTIVE: Primary | ICD-10-CM

## 2024-05-02 LAB
ANION GAP SERPL CALC-SCNC: 9 MMOL/L (ref 10–20)
BUN SERPL-MCNC: 22 MG/DL (ref 6–23)
CALCIUM SERPL-MCNC: 9.5 MG/DL (ref 8.6–10.3)
CHLORIDE SERPL-SCNC: 105 MMOL/L (ref 98–107)
CO2 SERPL-SCNC: 29 MMOL/L (ref 21–32)
CREAT SERPL-MCNC: 0.9 MG/DL (ref 0.5–1.05)
EGFRCR SERPLBLD CKD-EPI 2021: 66 ML/MIN/1.73M*2
ERYTHROCYTE [DISTWIDTH] IN BLOOD BY AUTOMATED COUNT: 14.6 % (ref 11.5–14.5)
GLUCOSE SERPL-MCNC: 96 MG/DL (ref 74–99)
HCT VFR BLD AUTO: 42.8 % (ref 36–46)
HGB BLD-MCNC: 14.3 G/DL (ref 12–16)
MCH RBC QN AUTO: 32.9 PG (ref 26–34)
MCHC RBC AUTO-ENTMCNC: 33.4 G/DL (ref 32–36)
MCV RBC AUTO: 98 FL (ref 80–100)
NRBC BLD-RTO: 0 /100 WBCS (ref 0–0)
PLATELET # BLD AUTO: 78 X10*3/UL (ref 150–450)
POTASSIUM SERPL-SCNC: 3.9 MMOL/L (ref 3.5–5.3)
RBC # BLD AUTO: 4.35 X10*6/UL (ref 4–5.2)
SODIUM SERPL-SCNC: 139 MMOL/L (ref 136–145)
WBC # BLD AUTO: 3.8 X10*3/UL (ref 4.4–11.3)

## 2024-05-02 PROCEDURE — 80048 BASIC METABOLIC PNL TOTAL CA: CPT

## 2024-05-02 PROCEDURE — 93010 ELECTROCARDIOGRAM REPORT: CPT | Performed by: INTERNAL MEDICINE

## 2024-05-02 PROCEDURE — 36415 COLL VENOUS BLD VENIPUNCTURE: CPT

## 2024-05-02 PROCEDURE — 93005 ELECTROCARDIOGRAM TRACING: CPT

## 2024-05-02 PROCEDURE — 99203 OFFICE O/P NEW LOW 30 MIN: CPT

## 2024-05-02 PROCEDURE — 85027 COMPLETE CBC AUTOMATED: CPT

## 2024-05-02 RX ORDER — ACETAMINOPHEN 325 MG/1
650 TABLET ORAL EVERY 6 HOURS PRN
COMMUNITY

## 2024-05-02 ASSESSMENT — DUKE ACTIVITY SCORE INDEX (DASI)
CAN YOU TAKE CARE OF YOURSELF (EAT, DRESS, BATHE, OR USE TOILET): YES
CAN YOU WALK INDOORS, SUCH AS AROUND YOUR HOUSE: YES
CAN YOU DO MODERATE WORK AROUND THE HOUSE LIKE VACUUMING, SWEEPING FLOORS OR CARRYING GROCERIES: YES
DASI METS SCORE: 7.3
CAN YOU DO LIGHT WORK AROUND THE HOUSE LIKE DUSTING OR WASHING DISHES: YES
CAN YOU HAVE SEXUAL RELATIONS: YES
CAN YOU PARTICIPATE IN MODERATE RECREATIONAL ACTIVITIES LIKE GOLF, BOWLING, DANCING, DOUBLES TENNIS OR THROWING A BASEBALL OR FOOTBALL: NO
CAN YOU PARTICIPATE IN STRENOUS SPORTS LIKE SWIMMING, SINGLES TENNIS, FOOTBALL, BASKETBALL, OR SKIING: NO
CAN YOU RUN A SHORT DISTANCE: NO
TOTAL_SCORE: 36.7
CAN YOU DO YARD WORK LIKE RAKING LEAVES, WEEDING OR PUSHING A MOWER: YES
CAN YOU CLIMB A FLIGHT OF STAIRS OR WALK UP A HILL: YES
CAN YOU WALK A BLOCK OR TWO ON LEVEL GROUND: YES
CAN YOU DO HEAVY WORK AROUND THE HOUSE LIKE SCRUBBING FLOORS OR LIFTING AND MOVING HEAVY FURNITURE: YES

## 2024-05-02 ASSESSMENT — ENCOUNTER SYMPTOMS
CARDIOVASCULAR NEGATIVE: 1
NEUROLOGICAL NEGATIVE: 1
ENDOCRINE NEGATIVE: 1
MUSCULOSKELETAL NEGATIVE: 1
ALLERGIC/IMMUNOLOGIC NEGATIVE: 1
PSYCHIATRIC NEGATIVE: 1
CONSTITUTIONAL NEGATIVE: 1

## 2024-05-02 ASSESSMENT — PAIN SCALES - GENERAL: PAINLEVEL_OUTOF10: 0 - NO PAIN

## 2024-05-02 ASSESSMENT — PAIN - FUNCTIONAL ASSESSMENT: PAIN_FUNCTIONAL_ASSESSMENT: 0-10

## 2024-05-02 NOTE — PREPROCEDURE INSTRUCTIONS
Medication List            Accurate as of May 2, 2024  3:25 PM. Always use your most recent med list.                acetaminophen 325 mg tablet  Commonly known as: Tylenol  Medication Adjustments for Surgery: Other (Comment)  Notes to patient: CAN TAKE MORNING OF SURGERY IF NEEDED     ferrous sulfate 325 (65 Fe) MG EC tablet  Medication Adjustments for Surgery: Stop 7 days before surgery     ICAPS AREDS ORAL  Medication Adjustments for Surgery: Stop 7 days before surgery     potassium chloride CR 10 mEq ER tablet  Commonly known as: Klor-Con  Take 1 tablet (10 mEq) by mouth 2 times a day.  Medication Adjustments for Surgery: Stop 7 days before surgery     triamterene-hydrochlorothiazid 75-50 mg tablet  Commonly known as: Maxzide  Take 1 tablet by mouth once daily in the morning. Take before meals.  Medication Adjustments for Surgery: Take morning of surgery with sip of water, no other fluids     ursodiol 500 mg tablet  Commonly known as: Actigall  Medication Adjustments for Surgery: Other (Comment)  Notes to patient: HOLD MORNING OF SURGERY     Vitamin D3 25 MCG (1000 UT) capsule  Generic drug: cholecalciferol  Medication Adjustments for Surgery: Stop 7 days before surgery                Preoperative Fasting Guidelines    Why must I stop eating and drinking near surgery time?  With sedation, food or liquid in your stomach can enter your lungs causing serious complications  Increases nausea and vomiting    When do I need to stop eating and drinking before my surgery?  Do not eat any food or drink any liquids after midnight the night before your surgery/procedure.  You may have sips of water to take medications.    PAT DISCHARGE INSTRUCTIONS    Please call the Same Day Surgery (SDS) Department of the hospital where your procedure will be performed after 2:00 PM the day before your surgery. If you are scheduled on a Monday, or a Tuesday following a Monday holiday, you will need to call on the last business day  prior to your surgery.    [facility]  [address], [id number] [phone]  [facility]  [address] [place] [address] [phone]  [facility]  [address] [place] [address]  [phone]    Please let your surgeon know if:      You develop any open sores, shingles, burning or painful urination as these may increase your risk of an infection.   You no longer wish to have the surgery.   Any other personal circumstances change that may lead to the need to cancel or defer this surgery-such as being sick or getting admitted to any hospital within one week of your planned procedure.    Your contact details change, such as a change of address or phone number.    Starting now:     Please DO NOT drink alcohol or smoke for 24 hours before surgery. It is well known that quitting smoking can make a huge difference to your health and recovery from surgery. The longer you abstain from smoking, the better your chances of a healthy recovery. If you need help with quitting, call 1-800-QUIT-NOW to be connected to a trained counselor who will discuss the best methods to help you quit.     Before your surgery:    Please stop all supplements 7 days prior to surgery. Or as directed by your surgeon.   Please stop taking NSAID pain medicine such as Advil and Motrin 7 days before surgery.    If you develop any fever, cough, cold, rashes, cuts, scratches, scrapes, urinary symptoms or infection anywhere on your body (including teeth and gums) prior to surgery, please call your surgeon’s office as soon as possible. This may require treatment to reduce the chance of cancellation on the day of surgery.    The day before your surgery:   DIET- Please follow the diet instructions at the top of your packet.   Get a good night’s rest.  Use the special soap for bathing if you have  been instructed to use one.    Scheduled surgery times may change and you will be notified if this occurs - please check your personal voicemail for any updates.     On the morning of surgery:   Wear comfortable, loose fitting clothes which open in the front. Please do not wear moisturizers, creams, lotions, makeup or perfume.    Please bring with you to surgery:   Photo ID and insurance card   Current list of medicines and allergies   Pacemaker/ Defibrillator/Heart stent cards   CPAP machine and mask    Slings/ splints/ crutches   A copy of your complete advanced directive/DHPOA.    Please do NOT bring with you to surgery:   All jewelry and valuables should be left at home.   Prosthetic devices such as contact lenses, hearing aids, dentures, eyelash extensions, hairpins and body piercings must be removed prior to going in to the surgical suite.    After outpatient surgery:   A responsible adult MUST accompany you at the time of discharge and stay with you for 24 hours after your surgery. You may NOT drive yourself home after surgery.    Do not drive, operate machinery, make critical decisions or do activities that require co-ordination or balance until after a night’s sleep.   Do not drink alcoholic beverages for 24 hours.   Instructions for resuming your medications will be provided by your surgeon.    CALL YOUR DOCTOR AFTER SURGERY IF YOU HAVE:     Chills and/or a fever of 101° F or higher.    Redness, swelling, pus or drainage from your surgical wound or a bad smell from the wound.    Lightheadedness, fainting or confusion.    Persistent vomiting (throwing up) and are not able to eat or drink for 12 hours.    Three or more loose, watery bowel movements in 24 hours (diarrhea).   Difficulty or pain while urinating( after non-urological surgery)    Pain and swelling in your legs, especially if it is only on one side.    Difficulty breathing or are breathing faster than normal.    Any new concerning  symptoms.

## 2024-05-02 NOTE — CPM/PAT H&P
CPM/PAT Evaluation       Name: Jessi Hallman (Jessi Hallman)  /Age: 1950/74 y.o.     In-Person       Chief Complaint: Preadmission Testing    HPI    Jessi is a 74 year old female who presents today for preadmission testing for upcoming:  LeFort Colpocleisis, Posterior Repair, Perineorrhaphy, Possible Mid-Urethral Sling, Cystoscopy   Diagnosis: Uterovaginal prolapse, LUMA (stress urinary incontinence, female)    Surgeon: Dr. Jackson    Patient reports that she has vaginal and rectal prolapse and for the past year she has been leaking urine and feels a vaginal bulge. She denies pain, describes as discomfort. She was seen by Dr. Jackson for plan of surgical intervention.     Patient is in no acute distress and denies fever, chills, SOB, and chest pain.     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)     [facility]; observation yearly; stable since 2017    Osteopenia     Portal hypertension (Multi)     [facility]    Primary biliary cirrhosis (Multi)     [facility]    Splenomegaly     [facility]    Thyroid nodule     Shayla: benign bx's x 2       Past Surgical History:   Procedure Laterality Date    CATARACT EXTRACTION Left 10/2022    COLONOSCOPY  2021    + polyps/nelida 5 yrs; [facility]    CT GUIDED PERCUTANEOUS BIOPSY LUNG  05/10/2021    + cancerous/carcinoid  Jared/Pete    ESOPHAGOGASTRODUODENOSCOPY  2021    eso varices; portal htn gastropathy; [facility]    US GUIDED PERCUTANEOUS PLACEMENT  10/26/2018    US GUIDED PERCUTANEOUS PLACEMENT [facility]     Social History     Tobacco Use    Smoking status: Former     Average packs/day: 0.5 packs/day for 40.0 years (20.0 ttl pk-yrs)     Types: Cigarettes     Start date:      Passive exposure: Past    Smokeless tobacco: Never   Substance Use Topics    Alcohol use: Never     "  Patient Sexual activity questions deferred to the physician.    Family History   Problem Relation Name Age of Onset    Ovarian cancer Mother      Hypertension Mother      Coronary artery disease Father      Hypertension Father      Hypertension Sister      No Known Problems Sister      Hypertension Brother         No Known Allergies    Current Outpatient Medications   Medication Instructions    acetaminophen (TYLENOL) 650 mg, oral, Every 6 hours PRN    cholecalciferol (Vitamin D3) 25 MCG (1000 UT) capsule 1 capsule, Every 24 hours    ferrous sulfate 325 (65 Fe) MG EC tablet Every 24 hours    potassium chloride CR 10 mEq ER tablet 10 mEq, oral, 2 times daily    triamterene-hydrochlorothiazid (Maxzide) 75-50 mg tablet 1 tablet, oral, Daily before breakfast    ursodiol (ACTIGALL) 500 mg, oral, 2 times daily    vit A/vit C/vit E/zinc/copper (ICAPS AREDS ORAL) Every 24 hours         Review of Systems   Constitutional: Negative.    HENT: Negative.     Eyes:         Wears glasses   Respiratory:          Lung CA   Cardiovascular: Negative.    Gastrointestinal:         Hx. Of biliary cirrhosis and splenomegaly, followed by GI.   Endocrine: Negative.    Genitourinary:         Stress incontinence   Musculoskeletal: Negative.    Skin: Negative.    Allergic/Immunologic: Negative.    Neurological: Negative.    Hematological:         Cytopenia   Psychiatric/Behavioral: Negative.        /66   Pulse 74   Temp 36.8 °C (98.2 °F) (Oral)   Resp 18   Ht 1.651 m (5' 5\")   Wt 97 kg (213 lb 14.4 oz)   SpO2 99%   BMI 35.59 kg/m²      Physical Exam  Vitals reviewed.   HENT:      Head: Normocephalic and atraumatic.      Nose: Nose normal.      Mouth/Throat:      Mouth: Mucous membranes are moist.   Eyes:      Extraocular Movements: Extraocular movements intact.      Conjunctiva/sclera: Conjunctivae normal.      Pupils: Pupils are equal, round, and reactive to light.   Cardiovascular:      Rate and Rhythm: Normal rate and regular " rhythm.      Pulses: Normal pulses.      Heart sounds: Normal heart sounds.   Pulmonary:      Effort: Pulmonary effort is normal.      Breath sounds: Normal breath sounds.   Abdominal:      General: Bowel sounds are normal.      Palpations: Abdomen is soft.   Genitourinary:     Comments: Deferred  Musculoskeletal:         General: Swelling present.      Cervical back: Normal range of motion and neck supple.   Skin:     General: Skin is warm and dry.   Neurological:      General: No focal deficit present.      Mental Status: She is alert and oriented to person, place, and time.   Psychiatric:         Mood and Affect: Mood normal.         Behavior: Behavior normal.        PAT AIRWAY:   Airway:     Mallampati::  III   partials    ASA: II  DASI:36.7  METS:7.3  CHADS: 2, 4.0 %  RCRI: 0.4 %  Stop Bang: 3      Assessment and Plan:     Uterovaginal prolapse, LUMA (stress urinary incontinence, female)- LeFort Colpocleisis, Posterior Repair, Perineorrhaphy, Possible Mid-Urethral Sling, Cystoscopy scheduled with Dr. Jackson.  2.    HTN- managed with triamterene-hydrochlorothiazide 75-50 mg daily   3.    HLD-Managed by PCP with diet  4.    Cytopenia-labs ordered by surgeon, possible heme clearance needed per Dr. Jackson's           note.       5.    Lung Ca-Followed by oncology, possible oncology clearance needed per Dr. Jackson's           note.  4.    Former Smoker    SUMEET Bolden-CNP

## 2024-05-03 LAB
ATRIAL RATE: 59 BPM
P AXIS: 54 DEGREES
P OFFSET: 206 MS
P ONSET: 138 MS
PR INTERVAL: 168 MS
Q ONSET: 222 MS
QRS COUNT: 10 BEATS
QRS DURATION: 90 MS
QT INTERVAL: 444 MS
QTC CALCULATION(BAZETT): 439 MS
QTC FREDERICIA: 441 MS
R AXIS: 13 DEGREES
T AXIS: 67 DEGREES
T OFFSET: 444 MS
VENTRICULAR RATE: 59 BPM

## 2024-05-09 ENCOUNTER — TELEPHONE (OUTPATIENT)
Dept: HEMATOLOGY/ONCOLOGY | Facility: CLINIC | Age: 74
End: 2024-05-09
Payer: MEDICARE

## 2024-05-09 ENCOUNTER — TELEMEDICINE (OUTPATIENT)
Dept: OBSTETRICS AND GYNECOLOGY | Facility: CLINIC | Age: 74
End: 2024-05-09
Payer: MEDICARE

## 2024-05-09 ENCOUNTER — TELEPHONE (OUTPATIENT)
Dept: OBSTETRICS AND GYNECOLOGY | Facility: CLINIC | Age: 74
End: 2024-05-09
Payer: MEDICARE

## 2024-05-09 DIAGNOSIS — N81.4 UTEROVAGINAL PROLAPSE: ICD-10-CM

## 2024-05-09 DIAGNOSIS — N39.3 SUI (STRESS URINARY INCONTINENCE, FEMALE): ICD-10-CM

## 2024-05-09 DIAGNOSIS — D69.6 LOW PLATELET COUNT (CMS-HCC): Primary | ICD-10-CM

## 2024-05-09 PROCEDURE — 99442 PR PHYS/QHP TELEPHONE EVALUATION 11-20 MIN: CPT | Performed by: OBSTETRICS & GYNECOLOGY

## 2024-05-09 PROCEDURE — 1159F MED LIST DOCD IN RCRD: CPT | Performed by: OBSTETRICS & GYNECOLOGY

## 2024-05-09 ASSESSMENT — ENCOUNTER SYMPTOMS
ENDOCRINE NEGATIVE: 1
RESPIRATORY NEGATIVE: 1
GASTROINTESTINAL NEGATIVE: 1
MUSCULOSKELETAL NEGATIVE: 1
NEUROLOGICAL NEGATIVE: 1
EYES NEGATIVE: 1
CONSTITUTIONAL NEGATIVE: 1
CARDIOVASCULAR NEGATIVE: 1
PSYCHIATRIC NEGATIVE: 1

## 2024-05-09 NOTE — TELEPHONE ENCOUNTER
"Patient calling about needing \"clearance\" for surgery on Monday 5/13/2024. I explained to patient that her hematologist/ oncologist needs to send clearance to office. She states that she only sees a gastroenterologist and they are trying to diagnose an autoimmune disorder. She has enlarged spleen, liver and has cirrhosis, despite the fact that she does not drink. [facility] is concerned about her platelet count and are requesting that surgery be canceled until this has improved. Cancellation for surgery sent to [facility] . Patient has my name and direct number when appropriate to schedule surgery again.  "

## 2024-05-09 NOTE — TELEPHONE ENCOUNTER
Dr. Espinoza do you want to place a referral to hematology Dr. Power?    Spoke to patient.  She follows with Dr. Sheikh at [facility] and she will call him for an appt.

## 2024-05-09 NOTE — PROGRESS NOTES
[facility] Department of Urogynecology   Nichol Jackson MD, MPH   [phone]    ASSESSMENT AND PLAN:   74 year old female with rectal prolapse, stage 4 uterovaginal prolapse, and MARI with urge and stress. Comorbidities include: HTN, hx of lung cancer, biliary cirrhosis, cytopenia, and h/o splenomegaly.      Diagnoses:  #1 Uterovaginal prolapse, stage 4  #2 Stress urinary incontinence     Plan:  1. Uterovaginal prolapse, preoperative counseling   - We counseled on the risks, surgical steps, benefits, expected outcomes, and postoperative recovery of the LeFort colpocleisis. The colpocleisis is suturing the front and back walls of the vagina together to prevent the prolapse from bulging out of the vagina past the introitus but we would leave two tracts from the cervix down the lateral sides of the vagina in case she were to ever develop vaginal bleeding/PMB in the future she would be symptomatic and we would able to evaluate it. She would not need a hysterectomy but we would order a pelvic U/S prior to the colpocleisis to evaluate the uterus prior to surgery. General risks of surgery include bleeding, infection, and damage to surrounding organs (i.e., bowel, bladder, ureters, and associated vasculature) if anything is damaged during surgery we will address it at that time. We specifically discussed that her anatomy will remain the same and her ability to defecate/urinate will remain intact but she will have a shortened vaginal length and will be unable to have penetrative intercourse after this procedure in the future. The benefits of this procedure is that it is quick with less time under anesthesia, less risks of damage to surrounding organs as we will not be operating abdominally, very low risk of infection and bleeding, and this procedure has a high success rate of around 95%.   > She is amenable to proceeding with the colpocleisis as previously discussed and scheduled.    - The patient has not gotten  her pelvic U/S prior to surgery and this was scheduled for 6/13/2024, however, her surgery is scheduled on 5/13/2024.   > Discussed that getting a preoperative pelvic U/S is necessary before surgery and to call  radiology department @ [phone] to reschedule this to 5/10/2024. She will call and arrange this.   - Reviewed her preoperative labs showed low platelet count = 78 on 5/2/2024. Shewas seen in PAT and they requested heme/onc clearance.  > Given that she has a low platelet count at baseline and clotting issues we discussed that the surgery carriers greater risk for her developing a hematoma and possible need for RBC transfusion/platelet transfusion.   > PT and INR normal  - After discussion again with PAT and review by anesthesia, they will not let the case proceed without heme/onc clearance. She will be re-scheduled.     2. LUMA, preoperative counseling   - We reviewed her UDS which demonstrated normal capacity, compliance, and sensation. + LUMA and no DO. Voids via detrusor mechanism. No PHAM. Recommend sling at time of colpocleisis. She is amenable to proceeding with the TVT at the time of her surgery to prevent occult LUMA leakage.   - Reviewed the risks, benefits and alternatives of Mid urethral sling. Risks include a 3% risk of mesh complications or erosion, 30% risk of urinary retention, risk of bleeding, infection, and damage to surrounding organs including bladder, urethra, ureters, bowel and blood vessels, as well as risk of fistula formation. We discussed the post operative voiding trial and plan based on the results of this trial. We discussed potential development or worsening of urgency incontinence, and potential need for sling lysis or removal in the future. We discussed an 85% cure or improvement rate with mid urethral sling. Alternatives to the procedure include observation, pelvic floor physical therapy, and vaginal pessary.  - Discussed that mid urethral sling does not address urgency  incontinence specifically but some patients with mixed incontinence report improvement in urgency symptoms. She understands she may still need treatment for her urgency urinary incontinence after this procedure.  - Perioperative antibiotics will be administered.  - Pain control post op with alternating Tylenol and ibuprofen, with oxycodone available for breakthrough pain. Goal of pain control is a level 4/10 or less.  - Recommend MiraLAX to avoid constipation. Take BID until first bowel movement after surgery. The goal is to have stools the consistency of toothpaste - soft and easy to pass without straining. Avoid diarrhea, if this occurs, reduce the dose of MiraLAX.    Follow up 2 weeks after surgery with Dr. Nichol Jackson for her first postoperative visit.     Phone call visit today: The patient's identity was confirmed and that she is located in [place].   Nichol Jackson MD MPH identified herself and the patient consented to a telehealth visit today. Telephone visit time: 7 minutes    Scribe Attestation  By signing my name below, IMadhu, Shainae, attest that this documentation has been prepared under the direction and in the presence of Nichol Jackson MD MPH on 05/09/2024 at 6:16 PM.     Problem List Items Addressed This Visit       Uterovaginal prolapse    LUMA (stress urinary incontinence, female)     Other Visit Diagnoses       Low platelet count (CMS-HCC)    -  Primary           I spent a total of 15 minutes in face to face and non face to face time.      Nichol Jackson MD, MPH, FACOG     I Dr. Jackson, personally performed the services described in the documentation as scribed in my presence and confirm it is both complete and accurate.  Nichol Jackson MD, MPH, FACOG      Established    HISTORY OF PRESENT ILLNESS:   74 year old female presenting in virtual follow up to review UDS and pelvic U/S with finalizing the POP corrective surgical plan.     Record Review:   - 4/25/2024 UDS: Normal capacity,  compliance, and sensation. + LUMA and no DO. Voids via detrusor mechanism. No PHAM. Recommend sling at time of colpocleisis.   - 4/23/2024 Dr. Nichol Jackson note RE: POP - Case request sent for LeFort colpocleisis, PA/perineorrhaphy, possible TVT or Bulkamid, and cystoscopy with Dr. Nichol Jackson @ [facility]. MARI with urge and stress - Ordered UDS and counseled on occult LUMA with possibility of needing anti-incontinence procedure if +LUMA on UDS (Bulkamid or TVT). Rectal prolapse - Placed referral to colo/rectal.     Prolapse Symptoms:  - She is amenable to proceeding with the POP repair as previously scheduled (LeFort colpocleisis and PA/perineorrhaphy).      Urinary Symptoms:   - The patient is amenable to getting the TVT at the time of surgery to prevent LUMA leakage.     General Health:   - She states she does not have a hematologist/oncologist anymore but rather follows with GI who is managing her autoimmune disease.   - Reports that if she accidentally scratches herself she has trouble clotting and excessively bleeds due to low platelet count secondary to her autoimmune disease. However, she denies any prior RBC transfusion.        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)     [facility]; observation yearly; stable since 2017    Osteopenia     Portal hypertension (Multi)     [facility]    Primary biliary cirrhosis (Multi)     [facility]    Splenomegaly     [facility]    Thyroid nodule     Shayla: benign bx's x 2        Past Surgical History:     Past Surgical History:   Procedure Laterality Date    CATARACT EXTRACTION Left 10/2022    COLONOSCOPY  06/2021    + polyps/nelida 5 yrs; [facility]    CT GUIDED PERCUTANEOUS BIOPSY LUNG  05/10/2021    + cancerous/carcinoid  Jared/Pete    ESOPHAGOGASTRODUODENOSCOPY  06/2021     eso varices; portal htn gastropathy; [facility]    US GUIDED PERCUTANEOUS PLACEMENT  10/26/2018    US GUIDED PERCUTANEOUS PLACEMENT [facility]       Medications:     Prior to Admission medications    Medication Sig Start Date End Date Taking? Authorizing Provider   acetaminophen (Tylenol) 325 mg tablet Take 2 tablets (650 mg) by mouth every 6 hours if needed for mild pain (1 - 3).    Historical Provider, MD   cholecalciferol (Vitamin D3) 25 MCG (1000 UT) capsule 1 capsule (25 mcg) once every 24 hours.    Historical Provider, MD   ferrous sulfate 325 (65 Fe) MG EC tablet once every 24 hours.    Historical Provider, MD   potassium chloride CR 10 mEq ER tablet Take 1 tablet (10 mEq) by mouth 2 times a day. 1/4/24 7/2/24  Angeles Whittaker MD   triamterene-hydrochlorothiazid (Maxzide) 75-50 mg tablet Take 1 tablet by mouth once daily in the morning. Take before meals. 1/4/24 7/2/24  Angeles Whittaker MD   ursodiol (Actigall) 500 mg tablet Take 1 tablet (500 mg) by mouth 2 times a day.    Historical Provider, MD   vit A/vit C/vit E/zinc/copper (ICAPS AREDS ORAL) once every 24 hours.    Historical Provider, MD        ROS  Review of Systems   Constitutional: Negative.    HENT: Negative.     Eyes: Negative.    Respiratory: Negative.     Cardiovascular: Negative.    Gastrointestinal: Negative.    Endocrine: Negative.    Genitourinary: Negative.    Musculoskeletal: Negative.    Neurological: Negative.    Psychiatric/Behavioral: Negative.              Data and DIAGNOSTIC STUDIES REVIEWED   ECG 12 lead    Result Date: 5/3/2024  Sinus bradycardia with marked sinus arrhythmia Otherwise normal ECG No previous ECGs available Confirmed by Michael Coughlin ([id number]) on 5/3/2024 3:26:05 PM    Lab Results   Component Value Date    GLUCOSE 96 05/02/2024    CALCIUM 9.5 05/02/2024     05/02/2024    K 3.9 05/02/2024    CO2 29 05/02/2024     05/02/2024    BUN 22 05/02/2024    CREATININE 0.90 05/02/2024     Lab Results   Component Value  Date    WBC 3.8 (L) 05/02/2024    HGB 14.3 05/02/2024    HCT 42.8 05/02/2024    MCV 98 05/02/2024    PLT 78 (L) 05/02/2024

## 2024-05-09 NOTE — TELEPHONE ENCOUNTER
Pt informed of need to get PCP clearance for surgery due to low platelets. All questions answered, & Jessi Hallman verbalized understanding.

## 2024-05-10 ENCOUNTER — HOSPITAL ENCOUNTER (OUTPATIENT)
Dept: RADIOLOGY | Facility: HOSPITAL | Age: 74
Discharge: HOME | End: 2024-05-10
Payer: MEDICARE

## 2024-05-10 DIAGNOSIS — Z01.818 PREOP TESTING: ICD-10-CM

## 2024-05-10 PROBLEM — R91.8 LUNG FIELD ABNORMAL: Status: ACTIVE | Noted: 2024-05-10

## 2024-05-10 PROBLEM — E04.1 THYROID NODULE: Status: ACTIVE | Noted: 2024-05-10

## 2024-05-10 PROBLEM — K11.20 PAROTITIS: Status: ACTIVE | Noted: 2024-05-10

## 2024-05-10 PROBLEM — F17.201 TOBACCO ABUSE, IN REMISSION: Status: ACTIVE | Noted: 2024-05-10

## 2024-05-10 PROBLEM — K62.3 RECTAL PROLAPSE: Status: ACTIVE | Noted: 2024-05-10

## 2024-05-10 PROBLEM — C7B.00: Status: ACTIVE | Noted: 2024-05-10

## 2024-05-10 PROBLEM — I85.00 ESOPHAGEAL VARICES WITHOUT BLEEDING (MULTI): Status: ACTIVE | Noted: 2023-07-27

## 2024-05-10 PROBLEM — R91.1 SOLITARY PULMONARY NODULE: Status: ACTIVE | Noted: 2024-05-10

## 2024-05-10 PROBLEM — R32 INCONTINENCE: Status: ACTIVE | Noted: 2024-04-23

## 2024-05-10 PROBLEM — C7A.8 MALIGNANT NEUROENDOCRINE NEOPLASM (MULTI): Status: ACTIVE | Noted: 2023-08-30

## 2024-05-10 PROCEDURE — 76856 US EXAM PELVIC COMPLETE: CPT

## 2024-05-14 ENCOUNTER — TELEPHONE (OUTPATIENT)
Dept: OBSTETRICS AND GYNECOLOGY | Facility: CLINIC | Age: 74
End: 2024-05-14
Payer: MEDICARE

## 2024-05-14 NOTE — TELEPHONE ENCOUNTER
Call to patient regarding rescheduling surgery. Offered her dates in June and July at [facility] or 5/31 at [facility]. She chose to schedule at [facility] because she has a family wedding in June. Case sent to [facility] for a colpocleisis, posterior repair/ perineorraphy, possible mid-urethral sling and cystoscopy. CPM was done at [facility] 5/2/2024. Patient states that she has appointment with hematologist on 5/15. Clearance from gastroenterologist sent to [facility].

## 2024-05-15 ENCOUNTER — OFFICE VISIT (OUTPATIENT)
Dept: HEMATOLOGY/ONCOLOGY | Facility: CLINIC | Age: 74
End: 2024-05-15
Payer: MEDICARE

## 2024-05-15 ENCOUNTER — DOCUMENTATION (OUTPATIENT)
Dept: PREADMISSION TESTING | Facility: HOSPITAL | Age: 74
End: 2024-05-15
Payer: MEDICARE

## 2024-05-15 ENCOUNTER — LAB (OUTPATIENT)
Dept: LAB | Facility: CLINIC | Age: 74
End: 2024-05-15
Payer: MEDICARE

## 2024-05-15 VITALS
WEIGHT: 214.73 LBS | DIASTOLIC BLOOD PRESSURE: 72 MMHG | BODY MASS INDEX: 35.73 KG/M2 | RESPIRATION RATE: 18 BRPM | TEMPERATURE: 97.7 F | HEART RATE: 71 BPM | SYSTOLIC BLOOD PRESSURE: 141 MMHG | OXYGEN SATURATION: 97 %

## 2024-05-15 DIAGNOSIS — K74.3 PRIMARY BILIARY CIRRHOSIS (MULTI): ICD-10-CM

## 2024-05-15 DIAGNOSIS — D69.6 THROMBOCYTOPENIA (CMS-HCC): ICD-10-CM

## 2024-05-15 DIAGNOSIS — R16.1 SPLENOMEGALY: ICD-10-CM

## 2024-05-15 DIAGNOSIS — R16.1 SPLENOMEGALY: Primary | ICD-10-CM

## 2024-05-15 LAB
BASOPHILS # BLD AUTO: 0.03 X10*3/UL (ref 0–0.1)
BASOPHILS NFR BLD AUTO: 0.9 %
EOSINOPHIL # BLD AUTO: 0.37 X10*3/UL (ref 0–0.4)
EOSINOPHIL NFR BLD AUTO: 10.5 %
ERYTHROCYTE [DISTWIDTH] IN BLOOD BY AUTOMATED COUNT: 14.3 % (ref 11.5–14.5)
HCT VFR BLD AUTO: 42.7 % (ref 36–46)
HGB BLD-MCNC: 13.6 G/DL (ref 12–16)
IMM GRANULOCYTES # BLD AUTO: 0.02 X10*3/UL (ref 0–0.5)
IMM GRANULOCYTES NFR BLD AUTO: 0.6 % (ref 0–0.9)
LYMPHOCYTES # BLD AUTO: 0.63 X10*3/UL (ref 0.8–3)
LYMPHOCYTES NFR BLD AUTO: 17.9 %
MCH RBC QN AUTO: 30.8 PG (ref 26–34)
MCHC RBC AUTO-ENTMCNC: 31.9 G/DL (ref 32–36)
MCV RBC AUTO: 97 FL (ref 80–100)
MONOCYTES # BLD AUTO: 0.33 X10*3/UL (ref 0.05–0.8)
MONOCYTES NFR BLD AUTO: 9.4 %
NEUTROPHILS # BLD AUTO: 2.13 X10*3/UL (ref 1.6–5.5)
NEUTROPHILS NFR BLD AUTO: 60.7 %
NRBC BLD-RTO: ABNORMAL /100{WBCS}
PLATELET # BLD AUTO: 61 X10*3/UL (ref 150–450)
RBC # BLD AUTO: 4.42 X10*6/UL (ref 4–5.2)
WBC # BLD AUTO: 3.5 X10*3/UL (ref 4.4–11.3)

## 2024-05-15 PROCEDURE — 3077F SYST BP >= 140 MM HG: CPT | Performed by: STUDENT IN AN ORGANIZED HEALTH CARE EDUCATION/TRAINING PROGRAM

## 2024-05-15 PROCEDURE — 85025 COMPLETE CBC W/AUTO DIFF WBC: CPT

## 2024-05-15 PROCEDURE — 1159F MED LIST DOCD IN RCRD: CPT | Performed by: STUDENT IN AN ORGANIZED HEALTH CARE EDUCATION/TRAINING PROGRAM

## 2024-05-15 PROCEDURE — 1125F AMNT PAIN NOTED PAIN PRSNT: CPT | Performed by: STUDENT IN AN ORGANIZED HEALTH CARE EDUCATION/TRAINING PROGRAM

## 2024-05-15 PROCEDURE — 99214 OFFICE O/P EST MOD 30 MIN: CPT | Performed by: STUDENT IN AN ORGANIZED HEALTH CARE EDUCATION/TRAINING PROGRAM

## 2024-05-15 PROCEDURE — 36415 COLL VENOUS BLD VENIPUNCTURE: CPT

## 2024-05-15 PROCEDURE — 85730 THROMBOPLASTIN TIME PARTIAL: CPT | Performed by: STUDENT IN AN ORGANIZED HEALTH CARE EDUCATION/TRAINING PROGRAM

## 2024-05-15 PROCEDURE — 85610 PROTHROMBIN TIME: CPT | Performed by: STUDENT IN AN ORGANIZED HEALTH CARE EDUCATION/TRAINING PROGRAM

## 2024-05-15 PROCEDURE — 3078F DIAST BP <80 MM HG: CPT | Performed by: STUDENT IN AN ORGANIZED HEALTH CARE EDUCATION/TRAINING PROGRAM

## 2024-05-15 ASSESSMENT — PAIN SCALES - GENERAL: PAINLEVEL: 5

## 2024-05-15 NOTE — PROGRESS NOTES
"YOSELIN Bolden  Nurse Practitioner  Specialty: Pre Admission Testing/Surgical    Patient ID: Jessi Hallman is a 74 y.o. female with history of thrombocytopenia. She was seen in [facility] on 5/2/2024 and it was noted that patient may need Heme/Onc clearance for scheduled:  [place] Colpocleisis, Posterior Repair, Perineorrhaphy, Possible Mid-Urethral Sling, Cystoscopy   Diagnosis: Uterovaginal prolapse, LUMA (stress urinary incontinence, female)    Surgeon: Dr. Jackson   DOS: 5/13/24  Location: [facility]    Surgery was cancelled by Anesthesia due to Platelets 78 drawn 5/2/24 for Heme/ONC clearance. Patient was referred to Dr. Lamont Bell Hematology/Oncology and seen in office 5/15/24.   Via Vettery Chat, Dr. Bell stated:    \"She has cirrhosis and her counts are likely resulting from that, there is no evidence that this is primarily hematological disease.\" He also stated \"I can comment on the lack of data for primary hematological disease, I have no data risk stratify or comment on the latter. There is no bone marrow biopsy.\"     Dr. Earnestine Vora was contacted via Vettery Chat and stated \"This is fine for surgery. So nothing to do other than potentially have platelets available. \"     Dr. Jackson was a participant in the Vettery Chat and agreed.     Clearance by AGUSTIN Ocampo DO scanned in Epic Media section 5/14/2024, Surgical Recommendations.     Surgery rescheduled for 5/31/24 at [facility].    YOSELIN Bolden                            "

## 2024-05-15 NOTE — PROGRESS NOTES
Patient ID: Jessi Hallman is a 74 y.o. female.  Referring Physician: No referring provider defined for this encounter.  Primary Care Provider: Angeles Whittaker MD  Visit Type: Follow Up  Diagnosis: Bicytopenia      Subjective    HPI  74 y.o. 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      Pt presents with son Gilberto.   Pt sees  for her metastatic carcinoid tumor. And has a gastroenterologist in [facility].    Review of the patient's chart shows stable bicytopenia with lymphopenia and thrombocytopenia in the moderate range since at least 2021.  I have no preceding labs on this patient.  Notices easy bruising, no B s/s, new masses. Has known about cirrhosis diagnosis since 2-3yrs, when she was being evaluated for her cancer.   Age appropriate cancer screening: EGD/colo in 1.5yrs, no varices. Pt noticed melanotic BMs while on PO iron, but not before that and no hematochezia.   Was diagnosed with cancer ~5yrs back, for which she has never been treated. But has evidence of stable disease in the lungs.   Former smoker, quit in 1990, in her 50s. Has never drank much ETOH. No RDA.   FMH: mother had uterine cancer, GF cancer. 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   Social history: Previously worked at a hospital, desk job in scheduling. No exposure to CT/RT/Toxins.     7/5/2023: pt presents for follow up with son. Reports a rash on the forearm that apparently starts with her scratching the area. Also a small bruise adjacent.   05/15/24: presents with daughter, reports was seen by anesthesia for colpocleisis, pre-op and was denied, requesting work up/clearance to assess her thrombocytopenia. As no further information was available, I contacted her gynecologist, then the ANNELISE who saw her in anesthesia and eventually the anesthesiologist. We discussed the work up documented on file back from  2023. I was informed that the pt had been optimized by her GI/hepatologist.     Review of Systems - Oncology  10 point review of systems negative except as stated in HPI    Objective   Past Surgical History:   Procedure Laterality Date    CATARACT EXTRACTION Left 10/2022    COLONOSCOPY  06/2021    + polyps/nelida 5 yrs; Ocampo    CT GUIDED PERCUTANEOUS BIOPSY LUNG  05/10/2021    + cancerous/carcinoid  Jared/Pete    ESOPHAGOGASTRODUODENOSCOPY  06/2021    eso varices; portal htn gastropathy; Ocampo    US GUIDED PERCUTANEOUS PLACEMENT  10/26/2018    US GUIDED PERCUTANEOUS PLACEMENT [facility]     Oncology History    No history exists.       BSA: 2.11 meters squared /72   Pulse 71   Temp 36.5 °C (97.7 °F) (Core)   Resp 18   Wt 97.4 kg (214 lb 11.7 oz)   SpO2 97%   BMI 35.73 kg/m²   Physical Exam  Vitals reviewed.   Constitutional:       General: She is not in acute distress.     Appearance: She is not toxic-appearing.   HENT:      Head: Normocephalic and atraumatic.   Eyes:      Comments: No icterus or pallor    Cardiovascular:      Rate and Rhythm: Normal rate and regular rhythm.   Pulmonary:      Effort: Pulmonary effort is normal.   Abdominal:      General: There is distension.      Palpations: Abdomen is soft.      Tenderness: There is no abdominal tenderness.   Neurological:      General: No focal deficit present.      Mental Status: She is oriented to person, place, and time.   Psychiatric:         Mood and Affect: Mood normal.         Assessment/Plan    74 y.o. female with a PMH significant for  liver cirrhosis, likely secondary to Ortiz, well differentiated carcinoid tumor ?metastatic or primary  to lung, who was previously followed by Dr. Bernal and now is followed by Dr. Espinoza, and has been evaluated for bicytopenia.      # Bicytopenia: Patient has no complaints related to her blood count abnormalities. She was not really aware of this diagnosis until it was brought up at her oncologist  visit. She takes no new medications, and per review of the records her counts have been stable since at least 2021.  Imaging continues to show evidence of massive splenomegaly.  She has a known diagnosis of cirrhosis. The patient has apparently had a full work-up including infectious evaluation for her cirrhosis with her GI doctor however once again I have no records for any of this.   Her work up from our visit showed mild lymphopenia, stable moderate thrombocytopenia, mild eosinophilia. Elevated t.bili and decreased albumin consistent with diagnosis of cirrhosis. PT WNL, lupus AC neg, aCL and B2GP Abs also neg. K/L ratio WNL, SPEP  neg for monoclonal protein. Iron, B12 and folate WNL. Hep B, C, HIV neg. Imaging from Feb '23 reviewed shows a splenomegaly ~16cms and cirrhosis.      Given her lymphopenia and stable moderate range thrombocytopenia its likely the patient has the cytopenias related to her cirrhosis and massive splenomegaly likely resulting in hypersplenism. No further work up indicated at that time and pt was referred back to PCP.   Since then plans to have a colpocleisis which carries relatively lower risk for bleeding. Her repeat labs are in the same range as previous, her surgical team is aware of her diagnosis and work up and the pt will have, per discussion, platelets on hold for transfusion as needed. Her coags are also at baseline, and PT is just above normal. No further work up warranted at this time.   Plan:   - pt to follow up with PCP and I can see her back as needed.     Lamont Sheikh MD

## 2024-05-16 LAB
APTT PPP: 33 SECONDS (ref 27–38)
INR PPP: 1.2 (ref 0.9–1.1)
PROTHROMBIN TIME: 13.1 SECONDS (ref 9.8–12.8)

## 2024-05-23 ENCOUNTER — APPOINTMENT (OUTPATIENT)
Dept: OBSTETRICS AND GYNECOLOGY | Facility: CLINIC | Age: 74
End: 2024-05-23
Payer: MEDICARE

## 2024-05-26 NOTE — PROGRESS NOTES
HPI:       Hypertension:          The patient has no issues.  Just saw Gi and needs ruq u/s and egd.  No changes in meds.         The patients cardiovascular risk factors include:         Cardiac Risk Factors  Age > 45-male, > 55-female:  YES  +1   Smoking:   NO   Sig. family hx of CHD*:  YES  +1   Hypertension:   YES  +1   Diabetes:   NO   HDL < 35:   NO   HDL > 59:   NO   Total: 3     *- Sig. family h/o CHD per NCEP = MI or sudden death at <56yo in   father or other 1st-degree male relative, or <66yo in mother or   other 1st-degree female relative           The patients adherence to the treatment regimen is Excellent         Responses to the medications has been Excellent    Hyperlipidemia:   The patient does not use medications that may worsen dyslipidemias (corticosteroids, progestins, anabolic steroids, diuretics, beta-blockers, amiodarone, cyclosporine, olanzapine). The patient exercises intermittently.  The patient is not known to have coexisting coronary artery disease.      MEDICAL HISTORY:   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)     Critical access hospital; observation yearly; stable since 2017    Osteopenia     Portal hypertension (Multi)     [facility]    Primary biliary cirrhosis (Multi)     Ocampo    Splenomegaly     Ocampo; heme-onc    Thyroid nodule     Shayla: benign bx's x 2     MEDICATIONS:   Current Outpatient Medications   Medication Sig Dispense Refill    acetaminophen (Tylenol) 325 mg tablet Take 2 tablets (650 mg) by mouth every 6 hours if needed for mild pain (1 - 3).      cholecalciferol (Vitamin D3) 25 MCG (1000 UT) capsule 1 capsule (25 mcg) once every 24 hours.      ursodiol (Actigall) 500 mg tablet Take 1 tablet (500 mg) by mouth 2 times a day.      vit A/vit C/vit E/zinc/copper (ICAPS AREDS ORAL) once every 24  hours.      potassium chloride CR 10 mEq ER tablet Take 1 tablet (10 mEq) by mouth 2 times a day. 180 tablet 1    triamterene-hydrochlorothiazid (Maxzide) 75-50 mg tablet Take 1 tablet by mouth once daily in the morning. Take before meals. 90 tablet 1     No current facility-administered medications for this visit.     ALLERGIES: No Known Allergies  FAMILY HISTORY:   Family History   Problem Relation Name Age of Onset    Ovarian cancer Mother      Hypertension Mother      Coronary artery disease Father      Hypertension Father      Hypertension Sister      No Known Problems Sister      Hypertension Brother       SOCIAL HISTORY:   Social History     Tobacco Use    Smoking status: Former     Average packs/day: 0.5 packs/day for 40.0 years (20.0 ttl pk-yrs)     Types: Cigarettes     Start date: 1973     Passive exposure: Past    Smokeless tobacco: Never   Vaping Use    Vaping status: Never Used   Substance Use Topics    Alcohol use: Never    Drug use: Never         ROS:      CONSTITUTION:  alert and oriented     OPHTHALMOLOGY:          no Change in vision.         CARDIOLOGY:          no Chest pain.  no Dyspnea on exertion.  no Shortness of breath.         ENDOCRINOLOGY:          no Excessive thirst.  no Excessive urination.  no Fatigue.  no Skin changes.  no Weight gain.  no Weight loss.         SKIN:          no Healing problems.         NEUROLOGY:          no Loss of sensation in specific body area.  no Burning pain in feet.  no Peripheral neuropathy.  no Tingling/numbness.    LABS:   Lab Results   Component Value Date    GLUCOSE 96 05/02/2024    CALCIUM 9.5 05/02/2024     05/02/2024    K 3.9 05/02/2024    CO2 29 05/02/2024     05/02/2024    BUN 22 05/02/2024    CREATININE 0.90 05/02/2024     Lab Results   Component Value Date    CHOL 116 05/10/2023    CHOL 109 (L) 10/24/2022    CHOL 119 (L) 10/14/2021     Lab Results   Component Value Date    HDL 58.4 05/10/2023    HDL 56 10/24/2022    HDL 60 10/14/2021  "    Lab Results   Component Value Date    LDLCALC 30 (L) 10/24/2022    LDLCALC 40 (L) 10/14/2021    LDLCALC 55 (L) 10/20/2020     Lab Results   Component Value Date    TRIG 91 05/10/2023    TRIG 117 10/24/2022    TRIG 94 10/14/2021     No components found for: \"CHOLHDL\"  Lab Results   Component Value Date    SHARATH 12 10/24/2022          VITAL SIGNS:  /76   Pulse 86   Wt 94.7 kg (208 lb 12.8 oz)   SpO2 97%   BMI 34.75 kg/m²     General Appearance: awake, alert, oriented, in no acute distress  Lungs: Normal expansion.  Clear to auscultation.  No rales, rhonchi, or wheezing.  Heart: Heart sounds are normal.  Regular rate and rhythm without murmur, gallop or rub.  Extremities: Extremities warm to touch, pink, with no edema.  Neurologic: Alert and oriented x 3, gait normal., reflexes normal and symmetric, strength and  sensation grossly normal    Jessi was seen today for hypertension.  Diagnoses and all orders for this visit:  Primary hypertension (Primary)  -     Albumin , Urine Random; Future  -     Urinalysis with Reflex Microscopic; Future  -     Follow Up In Primary Care - Established  -     potassium chloride CR 10 mEq ER tablet; Take 1 tablet (10 mEq) by mouth 2 times a day.  -     triamterene-hydrochlorothiazid (Maxzide) 75-50 mg tablet; Take 1 tablet by mouth once daily in the morning. Take before meals.  -     Follow Up In Primary Care - Established; Future  Mixed hyperlipidemia  Comments:  cont diet/exercise  Chronic obstructive pulmonary disease, unspecified COPD type (Multi)  Comments:  f/u with pulmonology  Metastatic carcinoid tumor (Multi)  Comments:  f/u with gi  Secondary esophageal varices without bleeding (Multi)  Comments:  f/u with gi             "

## 2024-05-31 ENCOUNTER — HOSPITAL ENCOUNTER (OUTPATIENT)
Facility: HOSPITAL | Age: 74
Discharge: HOME | End: 2024-06-01
Attending: OBSTETRICS & GYNECOLOGY | Admitting: OBSTETRICS & GYNECOLOGY
Payer: MEDICARE

## 2024-05-31 ENCOUNTER — ANESTHESIA EVENT (OUTPATIENT)
Dept: OPERATING ROOM | Facility: HOSPITAL | Age: 74
End: 2024-05-31
Payer: MEDICARE

## 2024-05-31 ENCOUNTER — ANESTHESIA (OUTPATIENT)
Dept: OPERATING ROOM | Facility: HOSPITAL | Age: 74
End: 2024-05-31
Payer: MEDICARE

## 2024-05-31 DIAGNOSIS — G89.18 POSTOPERATIVE PAIN: ICD-10-CM

## 2024-05-31 DIAGNOSIS — N39.3 SUI (STRESS URINARY INCONTINENCE, FEMALE): ICD-10-CM

## 2024-05-31 DIAGNOSIS — N81.4 UTEROVAGINAL PROLAPSE: Primary | ICD-10-CM

## 2024-05-31 LAB
ABO GROUP (TYPE) IN BLOOD: NORMAL
ANTIBODY SCREEN: NORMAL
RH FACTOR (ANTIGEN D): NORMAL

## 2024-05-31 PROCEDURE — 3600000008 HC OR TIME - EACH INCREMENTAL 1 MINUTE - PROCEDURE LEVEL THREE: Performed by: OBSTETRICS & GYNECOLOGY

## 2024-05-31 PROCEDURE — 57120 COLPOCLEISIS LE FORT TYPE: CPT | Performed by: OBSTETRICS & GYNECOLOGY

## 2024-05-31 PROCEDURE — 3700000002 HC GENERAL ANESTHESIA TIME - EACH INCREMENTAL 1 MINUTE: Performed by: OBSTETRICS & GYNECOLOGY

## 2024-05-31 PROCEDURE — G0378 HOSPITAL OBSERVATION PER HR: HCPCS

## 2024-05-31 PROCEDURE — 2500000001 HC RX 250 WO HCPCS SELF ADMINISTERED DRUGS (ALT 637 FOR MEDICARE OP): Performed by: OBSTETRICS & GYNECOLOGY

## 2024-05-31 PROCEDURE — 2780000003 HC OR 278 NO HCPCS: Performed by: OBSTETRICS & GYNECOLOGY

## 2024-05-31 PROCEDURE — 3600000003 HC OR TIME - INITIAL BASE CHARGE - PROCEDURE LEVEL THREE: Performed by: OBSTETRICS & GYNECOLOGY

## 2024-05-31 PROCEDURE — 57288 REPAIR BLADDER DEFECT: CPT | Performed by: OBSTETRICS & GYNECOLOGY

## 2024-05-31 PROCEDURE — 2500000004 HC RX 250 GENERAL PHARMACY W/ HCPCS (ALT 636 FOR OP/ED): Performed by: ANESTHESIOLOGIST ASSISTANT

## 2024-05-31 PROCEDURE — A4216 STERILE WATER/SALINE, 10 ML: HCPCS | Performed by: OBSTETRICS & GYNECOLOGY

## 2024-05-31 PROCEDURE — 36415 COLL VENOUS BLD VENIPUNCTURE: CPT | Performed by: ANESTHESIOLOGY

## 2024-05-31 PROCEDURE — 2720000007 HC OR 272 NO HCPCS: Performed by: OBSTETRICS & GYNECOLOGY

## 2024-05-31 PROCEDURE — 86901 BLOOD TYPING SEROLOGIC RH(D): CPT | Performed by: ANESTHESIOLOGY

## 2024-05-31 PROCEDURE — C1771 REP DEV, URINARY, W/SLING: HCPCS | Performed by: OBSTETRICS & GYNECOLOGY

## 2024-05-31 PROCEDURE — 2500000004 HC RX 250 GENERAL PHARMACY W/ HCPCS (ALT 636 FOR OP/ED): Mod: JZ | Performed by: OBSTETRICS & GYNECOLOGY

## 2024-05-31 PROCEDURE — 7100000001 HC RECOVERY ROOM TIME - INITIAL BASE CHARGE: Performed by: OBSTETRICS & GYNECOLOGY

## 2024-05-31 PROCEDURE — 7100000002 HC RECOVERY ROOM TIME - EACH INCREMENTAL 1 MINUTE: Performed by: OBSTETRICS & GYNECOLOGY

## 2024-05-31 PROCEDURE — 2500000005 HC RX 250 GENERAL PHARMACY W/O HCPCS: Performed by: ANESTHESIOLOGY

## 2024-05-31 PROCEDURE — 2500000005 HC RX 250 GENERAL PHARMACY W/O HCPCS: Performed by: ANESTHESIOLOGIST ASSISTANT

## 2024-05-31 PROCEDURE — 57250 REPAIR RECTUM & VAGINA: CPT | Performed by: OBSTETRICS & GYNECOLOGY

## 2024-05-31 PROCEDURE — 3700000001 HC GENERAL ANESTHESIA TIME - INITIAL BASE CHARGE: Performed by: OBSTETRICS & GYNECOLOGY

## 2024-05-31 DEVICE — TRANSVAGINAL MID-URETHRAL SLING
Type: IMPLANTABLE DEVICE | Site: VAGINA | Status: FUNCTIONAL
Brand: ADVANTAGE FIT™ BLUE SYSTEM

## 2024-05-31 RX ORDER — LIDOCAINE HYDROCHLORIDE 10 MG/ML
0.1 INJECTION, SOLUTION EPIDURAL; INFILTRATION; INTRACAUDAL; PERINEURAL ONCE
Status: DISCONTINUED | OUTPATIENT
Start: 2024-05-31 | End: 2024-05-31 | Stop reason: HOSPADM

## 2024-05-31 RX ORDER — LABETALOL HYDROCHLORIDE 5 MG/ML
5 INJECTION, SOLUTION INTRAVENOUS ONCE AS NEEDED
Status: DISCONTINUED | OUTPATIENT
Start: 2024-05-31 | End: 2024-05-31 | Stop reason: HOSPADM

## 2024-05-31 RX ORDER — HYDROMORPHONE HYDROCHLORIDE 1 MG/ML
INJECTION, SOLUTION INTRAMUSCULAR; INTRAVENOUS; SUBCUTANEOUS AS NEEDED
Status: DISCONTINUED | OUTPATIENT
Start: 2024-05-31 | End: 2024-05-31

## 2024-05-31 RX ORDER — CEFAZOLIN SODIUM 2 G/100ML
2 INJECTION, SOLUTION INTRAVENOUS ONCE
Status: DISCONTINUED | OUTPATIENT
Start: 2024-05-31 | End: 2024-05-31 | Stop reason: HOSPADM

## 2024-05-31 RX ORDER — SODIUM CHLORIDE, SODIUM LACTATE, POTASSIUM CHLORIDE, CALCIUM CHLORIDE 600; 310; 30; 20 MG/100ML; MG/100ML; MG/100ML; MG/100ML
100 INJECTION, SOLUTION INTRAVENOUS CONTINUOUS
Status: DISCONTINUED | OUTPATIENT
Start: 2024-05-31 | End: 2024-06-01 | Stop reason: HOSPADM

## 2024-05-31 RX ORDER — ONDANSETRON HYDROCHLORIDE 2 MG/ML
INJECTION, SOLUTION INTRAVENOUS AS NEEDED
Status: DISCONTINUED | OUTPATIENT
Start: 2024-05-31 | End: 2024-05-31

## 2024-05-31 RX ORDER — URSODIOL 300 MG/1
600 CAPSULE ORAL 2 TIMES DAILY
Status: DISCONTINUED | OUTPATIENT
Start: 2024-05-31 | End: 2024-06-01 | Stop reason: HOSPADM

## 2024-05-31 RX ORDER — SODIUM CHLORIDE, SODIUM LACTATE, POTASSIUM CHLORIDE, CALCIUM CHLORIDE 600; 310; 30; 20 MG/100ML; MG/100ML; MG/100ML; MG/100ML
100 INJECTION, SOLUTION INTRAVENOUS CONTINUOUS
Status: DISCONTINUED | OUTPATIENT
Start: 2024-05-31 | End: 2024-05-31 | Stop reason: HOSPADM

## 2024-05-31 RX ORDER — ACETAMINOPHEN 325 MG/1
650 TABLET ORAL EVERY 6 HOURS PRN
Status: DISCONTINUED | OUTPATIENT
Start: 2024-05-31 | End: 2024-06-01 | Stop reason: HOSPADM

## 2024-05-31 RX ORDER — LIDOCAINE HYDROCHLORIDE 20 MG/ML
INJECTION, SOLUTION EPIDURAL; INFILTRATION; INTRACAUDAL; PERINEURAL AS NEEDED
Status: DISCONTINUED | OUTPATIENT
Start: 2024-05-31 | End: 2024-05-31

## 2024-05-31 RX ORDER — PHENAZOPYRIDINE HYDROCHLORIDE 100 MG/1
100 TABLET, FILM COATED ORAL 3 TIMES DAILY PRN
Status: DISCONTINUED | OUTPATIENT
Start: 2024-05-31 | End: 2024-06-01 | Stop reason: HOSPADM

## 2024-05-31 RX ORDER — OXYCODONE HYDROCHLORIDE 5 MG/1
5 TABLET ORAL EVERY 4 HOURS PRN
Status: DISCONTINUED | OUTPATIENT
Start: 2024-05-31 | End: 2024-05-31 | Stop reason: HOSPADM

## 2024-05-31 RX ORDER — POTASSIUM CHLORIDE 750 MG/1
10 TABLET, FILM COATED, EXTENDED RELEASE ORAL 2 TIMES DAILY
Status: DISCONTINUED | OUTPATIENT
Start: 2024-05-31 | End: 2024-06-01 | Stop reason: HOSPADM

## 2024-05-31 RX ORDER — OXYCODONE HYDROCHLORIDE 5 MG/1
5 TABLET ORAL EVERY 4 HOURS PRN
Status: DISCONTINUED | OUTPATIENT
Start: 2024-05-31 | End: 2024-06-01 | Stop reason: HOSPADM

## 2024-05-31 RX ORDER — ACETAMINOPHEN 325 MG/1
975 TABLET ORAL ONCE
Status: COMPLETED | OUTPATIENT
Start: 2024-05-31 | End: 2024-05-31

## 2024-05-31 RX ORDER — HEPARIN SODIUM 5000 [USP'U]/ML
5000 INJECTION, SOLUTION INTRAVENOUS; SUBCUTANEOUS ONCE
Status: DISCONTINUED | OUTPATIENT
Start: 2024-05-31 | End: 2024-05-31

## 2024-05-31 RX ORDER — IBUPROFEN 600 MG/1
600 TABLET ORAL EVERY 6 HOURS PRN
Qty: 28 TABLET | Refills: 0 | Status: SHIPPED | OUTPATIENT
Start: 2024-05-31 | End: 2024-06-01 | Stop reason: HOSPADM

## 2024-05-31 RX ORDER — TRIAMTERENE/HYDROCHLOROTHIAZID 37.5-25 MG
2 TABLET ORAL DAILY
Status: DISCONTINUED | OUTPATIENT
Start: 2024-06-01 | End: 2024-06-01 | Stop reason: HOSPADM

## 2024-05-31 RX ORDER — METOCLOPRAMIDE HYDROCHLORIDE 5 MG/ML
10 INJECTION INTRAMUSCULAR; INTRAVENOUS EVERY 6 HOURS PRN
Status: DISCONTINUED | OUTPATIENT
Start: 2024-05-31 | End: 2024-06-01 | Stop reason: HOSPADM

## 2024-05-31 RX ORDER — ALBUTEROL SULFATE 0.83 MG/ML
2.5 SOLUTION RESPIRATORY (INHALATION) ONCE AS NEEDED
Status: DISCONTINUED | OUTPATIENT
Start: 2024-05-31 | End: 2024-05-31 | Stop reason: HOSPADM

## 2024-05-31 RX ORDER — PROPOFOL 10 MG/ML
INJECTION, EMULSION INTRAVENOUS AS NEEDED
Status: DISCONTINUED | OUTPATIENT
Start: 2024-05-31 | End: 2024-05-31

## 2024-05-31 RX ORDER — POLYETHYLENE GLYCOL 3350 17 G/17G
17 POWDER, FOR SOLUTION ORAL DAILY
Status: DISCONTINUED | OUTPATIENT
Start: 2024-05-31 | End: 2024-06-01 | Stop reason: HOSPADM

## 2024-05-31 RX ORDER — IBUPROFEN 600 MG/1
600 TABLET ORAL EVERY 6 HOURS PRN
Status: DISCONTINUED | OUTPATIENT
Start: 2024-05-31 | End: 2024-06-01 | Stop reason: HOSPADM

## 2024-05-31 RX ORDER — CELECOXIB 200 MG/1
400 CAPSULE ORAL ONCE
Status: COMPLETED | OUTPATIENT
Start: 2024-05-31 | End: 2024-05-31

## 2024-05-31 RX ORDER — METOCLOPRAMIDE 10 MG/1
10 TABLET ORAL EVERY 6 HOURS PRN
Status: DISCONTINUED | OUTPATIENT
Start: 2024-05-31 | End: 2024-06-01 | Stop reason: HOSPADM

## 2024-05-31 RX ORDER — MIDAZOLAM HYDROCHLORIDE 1 MG/ML
INJECTION INTRAMUSCULAR; INTRAVENOUS AS NEEDED
Status: DISCONTINUED | OUTPATIENT
Start: 2024-05-31 | End: 2024-05-31

## 2024-05-31 RX ORDER — CEFAZOLIN 1 G/1
INJECTION, POWDER, FOR SOLUTION INTRAVENOUS AS NEEDED
Status: DISCONTINUED | OUTPATIENT
Start: 2024-05-31 | End: 2024-05-31

## 2024-05-31 RX ORDER — OXYCODONE HYDROCHLORIDE 5 MG/1
5 TABLET ORAL EVERY 6 HOURS PRN
Qty: 5 TABLET | Refills: 0 | Status: SHIPPED | OUTPATIENT
Start: 2024-05-31 | End: 2024-06-03

## 2024-05-31 RX ORDER — ONDANSETRON HYDROCHLORIDE 2 MG/ML
4 INJECTION, SOLUTION INTRAVENOUS ONCE AS NEEDED
Status: DISCONTINUED | OUTPATIENT
Start: 2024-05-31 | End: 2024-05-31 | Stop reason: HOSPADM

## 2024-05-31 RX ORDER — FENTANYL CITRATE 50 UG/ML
INJECTION, SOLUTION INTRAMUSCULAR; INTRAVENOUS AS NEEDED
Status: DISCONTINUED | OUTPATIENT
Start: 2024-05-31 | End: 2024-05-31

## 2024-05-31 RX ORDER — PHENAZOPYRIDINE HYDROCHLORIDE 100 MG/1
200 TABLET, FILM COATED ORAL ONCE
Status: COMPLETED | OUTPATIENT
Start: 2024-05-31 | End: 2024-05-31

## 2024-05-31 RX ORDER — HYDRALAZINE HYDROCHLORIDE 20 MG/ML
5 INJECTION INTRAMUSCULAR; INTRAVENOUS EVERY 30 MIN PRN
Status: DISCONTINUED | OUTPATIENT
Start: 2024-05-31 | End: 2024-05-31 | Stop reason: HOSPADM

## 2024-05-31 RX ORDER — POLYETHYLENE GLYCOL 3350 17 G/17G
17 POWDER, FOR SOLUTION ORAL DAILY
Qty: 30 PACKET | Refills: 0 | Status: SHIPPED | OUTPATIENT
Start: 2024-05-31 | End: 2024-06-30

## 2024-05-31 RX ADMIN — CELECOXIB 400 MG: 200 CAPSULE ORAL at 11:28

## 2024-05-31 RX ADMIN — CEFAZOLIN 2 G: 1 INJECTION, POWDER, FOR SOLUTION INTRAMUSCULAR; INTRAVENOUS at 14:15

## 2024-05-31 RX ADMIN — HYDROMORPHONE HYDROCHLORIDE 0.25 MG: 1 INJECTION, SOLUTION INTRAMUSCULAR; INTRAVENOUS; SUBCUTANEOUS at 15:42

## 2024-05-31 RX ADMIN — SODIUM CHLORIDE, POTASSIUM CHLORIDE, SODIUM LACTATE AND CALCIUM CHLORIDE 100 ML/HR: 600; 310; 30; 20 INJECTION, SOLUTION INTRAVENOUS at 11:28

## 2024-05-31 RX ADMIN — PROPOFOL 50 MG: 10 INJECTION, EMULSION INTRAVENOUS at 14:20

## 2024-05-31 RX ADMIN — HYDROMORPHONE HYDROCHLORIDE 0.25 MG: 1 INJECTION, SOLUTION INTRAMUSCULAR; INTRAVENOUS; SUBCUTANEOUS at 15:52

## 2024-05-31 RX ADMIN — Medication 8 L/MIN: at 16:35

## 2024-05-31 RX ADMIN — FENTANYL CITRATE 50 MCG: 50 INJECTION, SOLUTION INTRAMUSCULAR; INTRAVENOUS at 14:12

## 2024-05-31 RX ADMIN — DEXAMETHASONE SODIUM PHOSPHATE 4 MG: 4 INJECTION, SOLUTION INTRAMUSCULAR; INTRAVENOUS at 14:15

## 2024-05-31 RX ADMIN — FENTANYL CITRATE 50 MCG: 50 INJECTION, SOLUTION INTRAMUSCULAR; INTRAVENOUS at 14:49

## 2024-05-31 RX ADMIN — PROPOFOL 150 MG: 10 INJECTION, EMULSION INTRAVENOUS at 14:12

## 2024-05-31 RX ADMIN — ONDANSETRON 4 MG: 2 INJECTION INTRAMUSCULAR; INTRAVENOUS at 14:21

## 2024-05-31 RX ADMIN — GLYCOPYRROLATE 0.2 MG: 0.2 INJECTION, SOLUTION INTRAMUSCULAR; INTRAVITREAL at 14:25

## 2024-05-31 RX ADMIN — FENTANYL CITRATE 50 MCG: 50 INJECTION, SOLUTION INTRAMUSCULAR; INTRAVENOUS at 14:20

## 2024-05-31 RX ADMIN — FENTANYL CITRATE 50 MCG: 50 INJECTION, SOLUTION INTRAMUSCULAR; INTRAVENOUS at 15:15

## 2024-05-31 RX ADMIN — HYDROMORPHONE HYDROCHLORIDE 0.25 MG: 1 INJECTION, SOLUTION INTRAMUSCULAR; INTRAVENOUS; SUBCUTANEOUS at 16:12

## 2024-05-31 RX ADMIN — PHENAZOPYRIDINE 200 MG: 100 TABLET ORAL at 11:28

## 2024-05-31 RX ADMIN — ACETAMINOPHEN 975 MG: 325 TABLET ORAL at 11:28

## 2024-05-31 RX ADMIN — MIDAZOLAM HYDROCHLORIDE 2 MG: 1 INJECTION, SOLUTION INTRAMUSCULAR; INTRAVENOUS at 14:07

## 2024-05-31 RX ADMIN — LIDOCAINE HYDROCHLORIDE 80 MG: 20 INJECTION, SOLUTION EPIDURAL; INFILTRATION; INTRACAUDAL; PERINEURAL at 14:12

## 2024-05-31 SDOH — ECONOMIC STABILITY: INCOME INSECURITY: IN THE LAST 12 MONTHS, WAS THERE A TIME WHEN YOU WERE NOT ABLE TO PAY THE MORTGAGE OR RENT ON TIME?: NO

## 2024-05-31 SDOH — SOCIAL STABILITY: SOCIAL INSECURITY: WITHIN THE LAST YEAR, HAVE YOU BEEN HUMILIATED OR EMOTIONALLY ABUSED IN OTHER WAYS BY YOUR PARTNER OR EX-PARTNER?: NO

## 2024-05-31 SDOH — SOCIAL STABILITY: SOCIAL INSECURITY: ARE THERE ANY APPARENT SIGNS OF INJURIES/BEHAVIORS THAT COULD BE RELATED TO ABUSE/NEGLECT?: NO

## 2024-05-31 SDOH — ECONOMIC STABILITY: FOOD INSECURITY: WITHIN THE PAST 12 MONTHS, THE FOOD YOU BOUGHT JUST DIDN'T LAST AND YOU DIDN'T HAVE MONEY TO GET MORE.: NEVER TRUE

## 2024-05-31 SDOH — SOCIAL STABILITY: SOCIAL INSECURITY: DO YOU FEEL UNSAFE GOING BACK TO THE PLACE WHERE YOU ARE LIVING?: NO

## 2024-05-31 SDOH — SOCIAL STABILITY: SOCIAL INSECURITY: WERE YOU ABLE TO COMPLETE ALL THE BEHAVIORAL HEALTH SCREENINGS?: YES

## 2024-05-31 SDOH — SOCIAL STABILITY: SOCIAL INSECURITY: WITHIN THE LAST YEAR, HAVE YOU BEEN AFRAID OF YOUR PARTNER OR EX-PARTNER?: NO

## 2024-05-31 SDOH — HEALTH STABILITY: PHYSICAL HEALTH: ON AVERAGE, HOW MANY MINUTES DO YOU ENGAGE IN EXERCISE AT THIS LEVEL?: 0 MIN

## 2024-05-31 SDOH — SOCIAL STABILITY: SOCIAL NETWORK: HOW OFTEN DO YOU GET TOGETHER WITH FRIENDS OR RELATIVES?: MORE THAN THREE TIMES A WEEK

## 2024-05-31 SDOH — SOCIAL STABILITY: SOCIAL NETWORK: HOW OFTEN DO YOU ATTEND CHURCH OR RELIGIOUS SERVICES?: MORE THAN 4 TIMES PER YEAR

## 2024-05-31 SDOH — ECONOMIC STABILITY: HOUSING INSECURITY
IN THE LAST 12 MONTHS, WAS THERE A TIME WHEN YOU DID NOT HAVE A STEADY PLACE TO SLEEP OR SLEPT IN A SHELTER (INCLUDING NOW)?: NO

## 2024-05-31 SDOH — SOCIAL STABILITY: SOCIAL NETWORK: ARE YOU MARRIED, WIDOWED, DIVORCED, SEPARATED, NEVER MARRIED, OR LIVING WITH A PARTNER?: MARRIED

## 2024-05-31 SDOH — SOCIAL STABILITY: SOCIAL INSECURITY: HAVE YOU HAD THOUGHTS OF HARMING ANYONE ELSE?: NO

## 2024-05-31 SDOH — ECONOMIC STABILITY: HOUSING INSECURITY: IN THE LAST 12 MONTHS, HOW MANY PLACES HAVE YOU LIVED?: 1

## 2024-05-31 SDOH — ECONOMIC STABILITY: FOOD INSECURITY: WITHIN THE PAST 12 MONTHS, YOU WORRIED THAT YOUR FOOD WOULD RUN OUT BEFORE YOU GOT MONEY TO BUY MORE.: NEVER TRUE

## 2024-05-31 SDOH — SOCIAL STABILITY: SOCIAL NETWORK
DO YOU BELONG TO ANY CLUBS OR ORGANIZATIONS SUCH AS CHURCH GROUPS UNIONS, FRATERNAL OR ATHLETIC GROUPS, OR SCHOOL GROUPS?: NO

## 2024-05-31 SDOH — HEALTH STABILITY: MENTAL HEALTH
STRESS IS WHEN SOMEONE FEELS TENSE, NERVOUS, ANXIOUS, OR CAN'T SLEEP AT NIGHT BECAUSE THEIR MIND IS TROUBLED. HOW STRESSED ARE YOU?: NOT AT ALL

## 2024-05-31 SDOH — SOCIAL STABILITY: SOCIAL INSECURITY
WITHIN THE LAST YEAR, HAVE YOU BEEN KICKED, HIT, SLAPPED, OR OTHERWISE PHYSICALLY HURT BY YOUR PARTNER OR EX-PARTNER?: NO

## 2024-05-31 SDOH — SOCIAL STABILITY: SOCIAL NETWORK
IN A TYPICAL WEEK, HOW MANY TIMES DO YOU TALK ON THE PHONE WITH FAMILY, FRIENDS, OR NEIGHBORS?: MORE THAN THREE TIMES A WEEK

## 2024-05-31 SDOH — HEALTH STABILITY: PHYSICAL HEALTH: ON AVERAGE, HOW MANY DAYS PER WEEK DO YOU ENGAGE IN MODERATE TO STRENUOUS EXERCISE (LIKE A BRISK WALK)?: 0 DAYS

## 2024-05-31 SDOH — SOCIAL STABILITY: SOCIAL INSECURITY
WITHIN THE LAST YEAR, HAVE TO BEEN RAPED OR FORCED TO HAVE ANY KIND OF SEXUAL ACTIVITY BY YOUR PARTNER OR EX-PARTNER?: NO

## 2024-05-31 SDOH — ECONOMIC STABILITY: TRANSPORTATION INSECURITY
IN THE PAST 12 MONTHS, HAS THE LACK OF TRANSPORTATION KEPT YOU FROM MEDICAL APPOINTMENTS OR FROM GETTING MEDICATIONS?: NO

## 2024-05-31 SDOH — SOCIAL STABILITY: SOCIAL INSECURITY: ARE YOU OR HAVE YOU BEEN THREATENED OR ABUSED PHYSICALLY, EMOTIONALLY, OR SEXUALLY BY ANYONE?: NO

## 2024-05-31 SDOH — SOCIAL STABILITY: SOCIAL NETWORK: HOW OFTEN DO YOU ATTENT MEETINGS OF THE CLUB OR ORGANIZATION YOU BELONG TO?: NEVER

## 2024-05-31 SDOH — ECONOMIC STABILITY: TRANSPORTATION INSECURITY
IN THE PAST 12 MONTHS, HAS LACK OF TRANSPORTATION KEPT YOU FROM MEETINGS, WORK, OR FROM GETTING THINGS NEEDED FOR DAILY LIVING?: NO

## 2024-05-31 SDOH — SOCIAL STABILITY: SOCIAL INSECURITY: HAS ANYONE EVER THREATENED TO HURT YOUR FAMILY OR YOUR PETS?: NO

## 2024-05-31 SDOH — SOCIAL STABILITY: SOCIAL INSECURITY: ABUSE: ADULT

## 2024-05-31 SDOH — SOCIAL STABILITY: SOCIAL INSECURITY: DOES ANYONE TRY TO KEEP YOU FROM HAVING/CONTACTING OTHER FRIENDS OR DOING THINGS OUTSIDE YOUR HOME?: NO

## 2024-05-31 SDOH — SOCIAL STABILITY: SOCIAL INSECURITY: HAVE YOU HAD ANY THOUGHTS OF HARMING ANYONE ELSE?: NO

## 2024-05-31 SDOH — SOCIAL STABILITY: SOCIAL INSECURITY: DO YOU FEEL ANYONE HAS EXPLOITED OR TAKEN ADVANTAGE OF YOU FINANCIALLY OR OF YOUR PERSONAL PROPERTY?: NO

## 2024-05-31 SDOH — ECONOMIC STABILITY: INCOME INSECURITY: HOW HARD IS IT FOR YOU TO PAY FOR THE VERY BASICS LIKE FOOD, HOUSING, MEDICAL CARE, AND HEATING?: NOT HARD AT ALL

## 2024-05-31 ASSESSMENT — ACTIVITIES OF DAILY LIVING (ADL)
GROOMING: INDEPENDENT
ASSISTIVE_DEVICE: EYEGLASSES
WALKS IN HOME: INDEPENDENT
DRESSING YOURSELF: INDEPENDENT
ADEQUATE_TO_COMPLETE_ADL: YES
TOILETING: INDEPENDENT
BATHING: INDEPENDENT
HEARING - RIGHT EAR: DIFFICULTY WITH NOISE
HEARING - LEFT EAR: DIFFICULTY WITH NOISE
LACK_OF_TRANSPORTATION: NO
JUDGMENT_ADEQUATE_SAFELY_COMPLETE_DAILY_ACTIVITIES: YES
PATIENT'S MEMORY ADEQUATE TO SAFELY COMPLETE DAILY ACTIVITIES?: YES
FEEDING YOURSELF: INDEPENDENT

## 2024-05-31 ASSESSMENT — COGNITIVE AND FUNCTIONAL STATUS - GENERAL
MOVING TO AND FROM BED TO CHAIR: A LITTLE
CLIMB 3 TO 5 STEPS WITH RAILING: A LOT
WALKING IN HOSPITAL ROOM: A LOT
MOBILITY SCORE: 18
DRESSING REGULAR LOWER BODY CLOTHING: A LITTLE
PATIENT BASELINE BEDBOUND: NO
HELP NEEDED FOR BATHING: A LITTLE
TOILETING: A LITTLE
STANDING UP FROM CHAIR USING ARMS: A LITTLE
DAILY ACTIVITIY SCORE: 20
DRESSING REGULAR UPPER BODY CLOTHING: A LITTLE

## 2024-05-31 ASSESSMENT — PAIN SCALES - GENERAL

## 2024-05-31 ASSESSMENT — PAIN - FUNCTIONAL ASSESSMENT

## 2024-05-31 ASSESSMENT — LIFESTYLE VARIABLES
SUBSTANCE_ABUSE_PAST_12_MONTHS: NO
SKIP TO QUESTIONS 9-10: 1
HOW MANY STANDARD DRINKS CONTAINING ALCOHOL DO YOU HAVE ON A TYPICAL DAY: PATIENT DOES NOT DRINK
HOW OFTEN DO YOU HAVE 6 OR MORE DRINKS ON ONE OCCASION: NEVER
AUDIT-C TOTAL SCORE: 0
PRESCIPTION_ABUSE_PAST_12_MONTHS: NO
AUDIT-C TOTAL SCORE: 0
HOW OFTEN DO YOU HAVE A DRINK CONTAINING ALCOHOL: NEVER

## 2024-05-31 ASSESSMENT — PATIENT HEALTH QUESTIONNAIRE - PHQ9
1. LITTLE INTEREST OR PLEASURE IN DOING THINGS: NOT AT ALL
SUM OF ALL RESPONSES TO PHQ9 QUESTIONS 1 & 2: 0
2. FEELING DOWN, DEPRESSED OR HOPELESS: NOT AT ALL

## 2024-05-31 ASSESSMENT — COLUMBIA-SUICIDE SEVERITY RATING SCALE - C-SSRS
6. HAVE YOU EVER DONE ANYTHING, STARTED TO DO ANYTHING, OR PREPARED TO DO ANYTHING TO END YOUR LIFE?: NO
2. HAVE YOU ACTUALLY HAD ANY THOUGHTS OF KILLING YOURSELF?: NO
1. IN THE PAST MONTH, HAVE YOU WISHED YOU WERE DEAD OR WISHED YOU COULD GO TO SLEEP AND NOT WAKE UP?: NO

## 2024-05-31 NOTE — ANESTHESIA POSTPROCEDURE EVALUATION
Patient: Jessi Hallman    Procedure Summary       Date: 05/31/24 Room / Location: [facility] A OR 06 / [facility] OR    Anesthesia Start: 1407 Anesthesia Stop: 1637    Procedures:       Colpocleisis      Posterior Repair; Perineorrhaphy      Mid-Urethral Sling      Cystoscopy Diagnosis:       Uterovaginal prolapse      LUMA (stress urinary incontinence, female)      (Uterovaginal prolapse [N81.4])      (LUMA (stress urinary incontinence, female) [N39.3])    Surgeons: Nichol Jackson MD MPH Responsible Provider: Tim Schneider MD    Anesthesia Type: general ASA Status: 3            Anesthesia Type: general    Vitals Value Taken Time   /98 05/31/24 1802   Temp 36.2 °C (97.2 °F) 05/31/24 1730   Pulse 80 05/31/24 1803   Resp 15 05/31/24 1745   SpO2 96 % 05/31/24 1803   Vitals shown include unfiled device data.    Anesthesia Post Evaluation    Patient participation: complete - patient participated  Level of consciousness: awake  Pain management: adequate  Airway patency: patent  Cardiovascular status: acceptable and hemodynamically stable  Respiratory status: acceptable  Hydration status: acceptable  Postoperative Nausea and Vomiting: none        No notable events documented.

## 2024-05-31 NOTE — ANESTHESIA PREPROCEDURE EVALUATION
Patient: Jessi Hallman    Procedure Information       Date/Time: 05/31/24 1230    Procedures:       Colpocleisis - *Patient had preadmission testing at [facility] on 5/2/2024. I will email medical clearance to preadmission testing at [facility]*      Posterior Repair; Perineorrhaphy      Mid-Urethral Sling      Cystoscopy    Location: [facility] A OR 06 / [facility] OR    Surgeons: Nichol Jackson MD MPH          75 yo F hx HTN, metastatic carcinoid to lung (being monitored), primary biliary cirrhosis (INR 1.2), hx thrombocytopenia (no bleeding issues; cleared by heme, T&S sent), portal HTN.    Relevant Problems   Cardiac   (+) HTN (hypertension)   (+) Hyperlipidemia      Pulmonary   (+) Metastatic malignant carcinoid tumor to lung (Multi)      GI   (+) Esophageal varices without bleeding (Multi)      Liver   (+) Primary biliary cirrhosis (Multi)       Clinical information reviewed:   Tobacco  Allergies  Meds   Med Hx  Surg Hx   Fam Hx  Soc Hx        NPO Detail:  NPO/Void Status  Carbohydrate Drink Given Prior to Surgery? : N  Date of Last Liquid: 05/31/24  Time of Last Liquid: 0700  Date of Last Solid: 05/30/24  Time of Last Solid: 2000  Last Intake Type: Clear fluids  Time of Last Void: 1100         Physical Exam    Airway  Mallampati: III  TM distance: >3 FB  Neck ROM: full     Cardiovascular   Rate: normal     Dental   Comments: Partials upper and lower.  Poor remaining dentition.  Denies loose teeth   Pulmonary - normal exam     Abdominal            Anesthesia Plan    History of general anesthesia?: yes  History of complications of general anesthesia?: no    ASA 3     general     intravenous induction   Postoperative administration of opioids is intended.  Anesthetic plan and risks discussed with patient.       Self

## 2024-05-31 NOTE — OP NOTE
Colpocleisis, Posterior Repair; Perineorrhaphy, Mid-Urethral Sling, Cystoscopy Operative Note     Date: 2024  OR Location: [facility] A OR    Name: Jessi Hallman, : 1950, Age: 74 y.o., MRN: [id number], Sex: female    Diagnosis  Pre-op Diagnosis     * Uterovaginal prolapse [N81.4]     * LUMA (stress urinary incontinence, female) [N39.3] Post-op Diagnosis     * Uterovaginal prolapse [N81.4]     * LUMA (stress urinary incontinence, female) [N39.3]     Procedures  Colpocleisis  49614 - [place] COLPOCLEISIS [place] TYPE    Cystoscopy  08098 - [place] CYSTOURETHROSCOPY    Posterior Repair; Perineorrhaphy  69713 - [place] POST COLPORRHAPHY RECTOCELE W/WO PERINEORRHAPHY    Mid-Urethral Sling  28836 - [place] SLING OPERATION STRESS INCONTINENCE      Surgeons      * Nichol Jackson - Primary    Resident/Fellow/Other Assistant:  Surgeons and Role:  * No surgeons found with a matching role *    Procedure Summary  Anesthesia: General  ASA: III  Anesthesia Staff: Anesthesiologist: Tim Schneider MD  C-AA: ADRIENNE Alvarado; ADRIENNE Roque  Estimated Blood Loss: 200mL  Intra-op Medications:   Administrations occurring from 1230 to 1530 on 24:   Medication Name Total Dose   vasopressin (Vasostrict) 20 Units in sodium chloride (PF) 0.9% 50 mL mixture 27 mL              Anesthesia Record               Intraprocedure I/O Totals          Intake    lactated Ringer's infusion 1200.00 mL    Total Intake 1200 mL       Output    Urine 200 mL    Est. Blood Loss 100 mL    Total Output 300 mL       Net    Net Volume 900 mL          Specimen: No specimens collected     Staff:   Scrub Person: Angi  Scrub Person: Emelina  Circulator: Denise Sepulveda Circulator: Hattie Sepulveda Scrub: Dana         Drains and/or Catheters:   Urethral Catheter 16 Fr. (Active)   Site Assessment Clean;Skin intact 24 1635   Collection Container Standard drainage bag 24 1635   Securement Method Securing device (Describe) 24 1635       Tourniquet  Times:         Implants:  Implants       Type Name Action Serial No.      Sling SLING, ADVANTAGE FIT BLUE SYSTEM - SNA - [id number] Implanted NA              Findings: Normal urethral and bladder epithelium. Orthotopic ureters with bilateral efflux seen. No stones, tumors, lesions or evidence of injury seen.       Indications: Jessi Hallman is an 74 y.o. female who is having surgery for Uterovaginal prolapse [N81.4]  LUMA (stress urinary incontinence, female) [N39.3].     The patient was seen in the preoperative area. The risks, benefits, complications, treatment options, non-operative alternatives, expected recovery and outcomes were discussed with the patient. The possibilities of reaction to medication, pulmonary aspiration, injury to surrounding structures, bleeding, recurrent infection, the need for additional procedures, failure to diagnose a condition, and creating a complication requiring transfusion or operation were discussed with the patient. The patient concurred with the proposed plan, giving informed consent.  The site of surgery was properly noted/marked if necessary per policy. The patient has been actively warmed in preoperative area. Preoperative antibiotics have been ordered and given within 1 hours of incision. Venous thrombosis prophylaxis have been ordered including bilateral sequential compression devices    Procedure Details:     FINDINGS: Vaginal apex extended 10 cm beyond the hymenal ring with gentle traction, prolapse was apical predominant. Vaginal tissues were  atrophic.  At the end of the surgery, vaginal length was at least 3 cm and all compartments were well-supported. Cystoscopy revealed normal bladder mucosa without evidence of trauma and bilateral ureteral efflux.       CONDITION: Stable     INDICATIONS FOR PROCEDURE: 74 y.o.  with stage 4 vaginal  prolapse. History notable for LUMA and thrombocytopenia. She desired surgical management of this.       [place] A time out was taken  verifying patient name and intended procedure.    INDICATIONS FOR PROCEDURE: Symptomatic vaginal vault prolapse.  She was counseled extensively that the vagina would subsequently be too short for vaginal intercourse.  Because of the lower morbidity associated with a colpocleisis and her clear understanding that sexual intercourse would not be possible, we decided to proceed with a colpocleisis.      PROCEDURE AND FINDINGS: A general anesthetic was administered and the patient was placed in the dorsal lithotomy position in candy cane stirrups and prepped and draped in the normal sterile fashion. A Arroyo catheter was placed.      Markings at the base of the vagina and laterally for channels were made and vasopressin was injected circumferentially around the everted vagina.  The posterior vaginal epithelium was divided and the rectovaginal muscularis was mobilized.  The anterior vaginal wall was placed on traction and then divided in the midline. The pubocervical fascia was then dissected free from the vaginal epithelium.  The vaginal epithelium was then removed.  Several anterior to posterior sutures of 2-0 Vicryl bringing the pubocervical and rectovaginal muscularis into contact were placed to reduce the prolapse.   Cystoscopy was performed demonstrating efflux of urine from each ureter and the absence of bladder injury.  The vaginal epithelium was reapproximated with a 2-0 Vicryl in a running fashion.    Vasopressin was infiltrated on each side of the urethra and retropubically.  A midurethral incision was made and the periurethral space was developed with Metzenbaum scissors.  With the bladder empty and the urethra deflected to the contralateral side, the TVT needle was advanced through the endopelvic fascia, space of Retzius and abdominal wall to a premarked spot.  The same procedure was carried out on the contralateral side.  Care was taken to avoid bladder injury and cystoscopy with the trocars in place showed  the absence of bladder penetration.  After the needles had been drawn through the abdominal wall, the tape was adjusted so that it rested underneath the midportion of the urethra in a tension free manner.  The plastic sleeves were carefully removed while the suburethral portion of the sling was stabilized.  The suprapubic incisions were closed with 4-0 vicryl and the vaginal wall was closed with a running 3-0 Vicryl stitch. At the end of the operation, all areas were hemostatic.  The patient was sent to the recovery area in stable condition.     To close the hiatus a posterior repair and perineorrhaphy were performed, the levator ani muscles were reapproximated with interrupted 2-0 PDS sutures. The vaginal epithelium was closed with 2-0 Vicryl.  Normal rectal exam.    The Arroyo catheter was left to drain. The patient tolerated the procedures well. Sponge, instrument and needle counts were correct times two.  The patient was taken to the recovery room in stable condition.     Complications:  None; patient tolerated the procedure well.    Disposition: PACU - hemodynamically stable.  Condition: stable           Attending Attestation: I was present and scrubbed for the entire procedure.    Nichol Jackson  Phone Number: [phone]

## 2024-05-31 NOTE — DISCHARGE INSTRUCTIONS
Urogynecology Post Operative instructions  Clinic number: [phone]    Call your physicians office or go to the nearest emergency room if you have any of the following:   · Fever higher than 100.4 F, chills, sweats.   · Redness, tenderness, increased swelling or drainage at incision.   · Difficulty swallowing or eating.  · Nausea or vomiting.  · Increased pain or pain that is not controlled.   · Increased cough or productive cough of yellow or green colored phlegm.   · Vaginal bleeding soaking more than a pad/hour.  · Any other symptoms that are concerning to you  · Go IMMEDIATELY to the emergency department if you experience shortness of breath    Medications:     For Pain:   · Tylenol (acetaminophen) is your first line therapies for pain. You can take each of these medications every 6 (six) hours.   · You have been given a prescription for a narcotic pain medication, oxycodone, to help with postoperative discomfort.  You can take this medication if you have taken Tylenol and ibuprofen and your pain is still greater than a 4 out of 10. The best way to wean off of narcotic pain medications is by alternating them with Ibuprofen and by slowly lengthening the time between your doses of narcotic pain medication.    · You may also try a heating pad to help relieve your pain. Be certain to protect your skin by placing a towel between you and the heating pad. DO NOT fall asleep with the heating pad in place.     For Constipation:   · Narcotic pain medications cause constipation, so you should take Miralax, once or twice daily as long as you are taking narcotic pain medication.    · If no bowel movement in 48 hours, try Milk of Magnesia.   · Drink 6 glasses of water per day.     Wound Care:   As you heal, your incision will look better and the soreness will go away. You may also feel numbness or a “pins and needle” sensation in the area of your incision.   · You may shower once you return home. Wash the incision(s)  gently with soap and water during your regular shower and pat dry with a clean towel.   · DO NOT take a bath or submerge your incision in water (NO swimming or hot tubs) for three (3) weeks.   · Do not put any ointments or creams on your incision for 3 to 4 weeks (they can hinder healing).   · Some vaginal bleeding is normal after vaginal surgery. If you are soaking pads in 1-2 hours, please call the office.       It is very important to keep all of your post operative clinic appointments.

## 2024-05-31 NOTE — H&P
History Of Present Illness  Jessi Hallman is a 74 y.o. female presenting with prolapse and urinary incontinence here for scheduled surgery.  Of note patient received clearance from hematology/oncology due to thombocytopenia presumed due to cirrhosis. Last PLT 61.   No postmenopausal bleeding, normal pre-operative imaging.      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)     [facility]; observation yearly; stable since 2017    Osteopenia     Portal hypertension (Multi)     [facility]    Primary biliary cirrhosis (Multi)     [facility]    Splenomegaly     [facility]; heme-onc    Thyroid nodule     [facility]: benign bx's x 2       Surgical History  Past Surgical History:   Procedure Laterality Date    CATARACT EXTRACTION Left 10/2022    COLONOSCOPY  06/2021    + polyps/nelida 5 yrs; [facility]    CT GUIDED PERCUTANEOUS BIOPSY LUNG  05/10/2021    + cancerous/carcinoid  [facility]    ESOPHAGOGASTRODUODENOSCOPY  06/2021    eso varices; portal htn gastropathy; [facility]    US GUIDED PERCUTANEOUS PLACEMENT  10/26/2018    US GUIDED PERCUTANEOUS PLACEMENT [facility]        Social History  She reports that she has quit smoking. Her smoking use included cigarettes. She started smoking about 51 years ago. She has a 20 pack-year smoking history. She has been exposed to tobacco smoke. She has never used smokeless tobacco. She reports that she does not drink alcohol and does not use drugs.    Family History  Family History   Problem Relation Name Age of Onset    Ovarian cancer Mother      Hypertension Mother      Coronary artery disease Father      Hypertension Father      Hypertension Sister      No Known Problems Sister      Hypertension Brother          Allergies  Patient has no known allergies.    Review of Systems   Cardiovascular:   "       Hypertension   Gastrointestinal:         Biliary cirrhosis, splenomegaly   Genitourinary:         See HPI   Hematological:         Thrombocytopenia   All other systems reviewed and are negative.       Physical Exam  Vitals reviewed.   Constitutional:       Appearance: Normal appearance.   HENT:      Head: Normocephalic and atraumatic.   Cardiovascular:      Rate and Rhythm: Normal rate.   Pulmonary:      Effort: Pulmonary effort is normal.   Abdominal:      General: Abdomen is flat.      Palpations: Abdomen is soft.   Skin:     General: Skin is warm and dry.   Neurological:      Mental Status: She is alert.          Last Recorded Vitals  Blood pressure 178/56, pulse 72, temperature 36.8 °C (98.2 °F), resp. rate 16, height 1.651 m (5' 5\"), weight 92.6 kg (204 lb 2.3 oz), SpO2 98%.       Assessment/Plan      Proceed with scheduled colpocleisis, perineorrhaphy, and midurethral sling with cystoscopy   Consent completed.   Ancef ppx ordered. HOLD heparin ppx, bilateral SCDs ordered.   Postop care and instructions discussed.        Martha Hightower MD - URPS Fellow     "

## 2024-05-31 NOTE — ANESTHESIA PROCEDURE NOTES
Airway  Date/Time: 5/31/2024 2:13 PM  Urgency: elective    Airway not difficult    Staffing  Performed: [facility]   Authorized by: Tim Schneider MD    Performed by: ADRIENNE Alvarado  Patient location during procedure: OR    Indications and Patient Condition  Indications for airway management: anesthesia  Spontaneous Ventilation: absent  Sedation level: deep  Preoxygenated: yes  Patient position: sniffing  MILS not maintained throughout  Mask difficulty assessment: 1 - vent by mask    Final Airway Details  Final airway type: supraglottic airway      Successful airway: Size 4     Number of attempts at approach: 1

## 2024-06-01 ENCOUNTER — TELEPHONE (OUTPATIENT)
Dept: OBSTETRICS AND GYNECOLOGY | Facility: CLINIC | Age: 74
End: 2024-06-01
Payer: MEDICARE

## 2024-06-01 VITALS
RESPIRATION RATE: 16 BRPM | DIASTOLIC BLOOD PRESSURE: 70 MMHG | WEIGHT: 204.15 LBS | HEART RATE: 67 BPM | SYSTOLIC BLOOD PRESSURE: 142 MMHG | OXYGEN SATURATION: 94 % | TEMPERATURE: 97.2 F | HEIGHT: 65 IN | BODY MASS INDEX: 34.01 KG/M2

## 2024-06-01 LAB
ERYTHROCYTE [DISTWIDTH] IN BLOOD BY AUTOMATED COUNT: 14.4 % (ref 11.5–14.5)
HCT VFR BLD AUTO: 38 % (ref 36–46)
HGB BLD-MCNC: 12.2 G/DL (ref 12–16)
MCH RBC QN AUTO: 31.1 PG (ref 26–34)
MCHC RBC AUTO-ENTMCNC: 32.1 G/DL (ref 32–36)
MCV RBC AUTO: 97 FL (ref 80–100)
NRBC BLD-RTO: 0 /100 WBCS (ref 0–0)
PLATELET # BLD AUTO: 53 X10*3/UL (ref 150–450)
RBC # BLD AUTO: 3.92 X10*6/UL (ref 4–5.2)
WBC # BLD AUTO: 4.4 X10*3/UL (ref 4.4–11.3)

## 2024-06-01 PROCEDURE — 7100000011 HC EXTENDED STAY RECOVERY HOURLY - NURSING UNIT

## 2024-06-01 PROCEDURE — 2500000004 HC RX 250 GENERAL PHARMACY W/ HCPCS (ALT 636 FOR OP/ED): Performed by: STUDENT IN AN ORGANIZED HEALTH CARE EDUCATION/TRAINING PROGRAM

## 2024-06-01 PROCEDURE — 36415 COLL VENOUS BLD VENIPUNCTURE: CPT | Performed by: STUDENT IN AN ORGANIZED HEALTH CARE EDUCATION/TRAINING PROGRAM

## 2024-06-01 PROCEDURE — 2500000001 HC RX 250 WO HCPCS SELF ADMINISTERED DRUGS (ALT 637 FOR MEDICARE OP): Performed by: STUDENT IN AN ORGANIZED HEALTH CARE EDUCATION/TRAINING PROGRAM

## 2024-06-01 PROCEDURE — 2500000002 HC RX 250 W HCPCS SELF ADMINISTERED DRUGS (ALT 637 FOR MEDICARE OP, ALT 636 FOR OP/ED): Mod: MUE | Performed by: STUDENT IN AN ORGANIZED HEALTH CARE EDUCATION/TRAINING PROGRAM

## 2024-06-01 PROCEDURE — 85027 COMPLETE CBC AUTOMATED: CPT | Performed by: STUDENT IN AN ORGANIZED HEALTH CARE EDUCATION/TRAINING PROGRAM

## 2024-06-01 PROCEDURE — G0378 HOSPITAL OBSERVATION PER HR: HCPCS

## 2024-06-01 RX ADMIN — TRIAMTERENE AND HYDROCHLOROTHIAZIDE 2 TABLET: 37.5; 25 TABLET ORAL at 10:24

## 2024-06-01 RX ADMIN — SODIUM CHLORIDE, POTASSIUM CHLORIDE, SODIUM LACTATE AND CALCIUM CHLORIDE 100 ML/HR: 600; 310; 30; 20 INJECTION, SOLUTION INTRAVENOUS at 04:25

## 2024-06-01 RX ADMIN — POLYETHYLENE GLYCOL 3350 17 G: 17 POWDER, FOR SOLUTION ORAL at 10:24

## 2024-06-01 RX ADMIN — POTASSIUM CHLORIDE 10 MEQ: 750 TABLET, EXTENDED RELEASE ORAL at 10:24

## 2024-06-01 RX ADMIN — URSODIOL 600 MG: 300 CAPSULE ORAL at 10:23

## 2024-06-01 ASSESSMENT — COGNITIVE AND FUNCTIONAL STATUS - GENERAL
MOBILITY SCORE: 20
DAILY ACTIVITIY SCORE: 20
CLIMB 3 TO 5 STEPS WITH RAILING: A LITTLE
PERSONAL GROOMING: A LITTLE
HELP NEEDED FOR BATHING: A LITTLE
TOILETING: A LITTLE
WALKING IN HOSPITAL ROOM: A LITTLE
STANDING UP FROM CHAIR USING ARMS: A LITTLE
DRESSING REGULAR LOWER BODY CLOTHING: A LITTLE
MOVING TO AND FROM BED TO CHAIR: A LITTLE

## 2024-06-01 ASSESSMENT — PAIN SCALES - GENERAL: PAINLEVEL_OUTOF10: 0 - NO PAIN

## 2024-06-01 NOTE — PROGRESS NOTES
"Jessi Hallman is a 74 y.o. female on day 0 of admission presenting with Uterovaginal prolapse s/p colpocleisis, PA, MUS and cystourethroscopy    Subjective   Called to bedside by RN in PACU for persistent slow trickle of dark red blood from vagina, has saturated about 1 pad per hour since arrival to recovery. Was able to void normally after bladder backfilled and catheter removed with minimal residual. Felt slightly woozy getting up to restroom but is otherwise well, no chest pain, dyspnea or headaches. She feels a little tired and wants to rest. Minimal vaginal pain. No suprapubic/bladder or rectal pain. Patient's daughter remains at bedside.        Objective     Physical Exam  Vitals reviewed. Exam conducted with a chaperone present.   Constitutional:       Appearance: Normal appearance. She is obese.   Cardiovascular:      Rate and Rhythm: Normal rate.   Abdominal:      General: Abdomen is flat.      Palpations: Abdomen is soft.   Genitourinary:     Comments: Suprapubic puncture sites without tenderness or evidence of hematoma, suture present, bandages applied  Urethral caruncle without bleeding noted  Slow trickle of dark red blood per vagina with slow welling, limited visualization of the internal suture line, no particular bleeding areas noted, perineal sutures in tact without oozing from this suture line; Renetta powder placed in vaginal vault followed by one vaginal pack tied to Arroyo catheter with no further bleeding noted  Rectal prolapse   Musculoskeletal:         General: No swelling or tenderness. Normal range of motion.      Comments: SCDs applied   Skin:     General: Skin is warm and dry.   Neurological:      General: No focal deficit present.      Mental Status: She is alert and oriented to person, place, and time.       Last Recorded Vitals  Blood pressure 154/58, pulse 79, temperature 36.2 °C (97.2 °F), temperature source Temporal, resp. rate 16, height 1.651 m (5' 5\"), weight 92.6 kg (204 lb 2.3 " oz), SpO2 93%.  Intake/Output last 3 Shifts:  I/O last 3 completed shifts:  In: 1200 (13 mL/kg) [I.V.:1200 (13 mL/kg)]  Out: 900 (9.7 mL/kg) [Urine:800 (0.2 mL/kg/hr); Blood:100]  Weight: 92.6 kg         Assessment/Plan   Principal Problem:    Uterovaginal prolapse  Active Problems:    Postoperative pain    Jessi Hallman is a 74 y.o. female on day 0 of admission presenting with Uterovaginal prolapse s/p colpocleisis, HI, MUS and cystourethroscopy with baseline thrombocytopenia noted to have postoperative bleeding per vagina    - vaginal packing applied with Renetta, tied to Arroyo, to remain in overnight until Dr Jackson evaluates patient tomorrow  - vitals remain wnl  - LR @ 100cc/hr   - regular diet, miralax, reglan prn   - ibuprofen/tylenol/oxycodone prn for pain, no pain currently   - maintain SCDs, hold any anticoagulants   - PLT 61, Hgb 13.6 > AM CBC ordered     POC d/w Dr Jackson, patient and daughter amenable to plan, overnight observation anticipated discharge tomorrow s/p packing removal       Martha Hightower MD - URPS Fellow

## 2024-06-01 NOTE — CARE PLAN
The patient's goals for the shift include      The clinical goals for the shift include Get rest and get home  Problem: Pain  Goal: My pain/discomfort is manageable  Outcome: Adequate for Discharge     Problem: Safety  Goal: Patient will be injury free during hospitalization  Outcome: Adequate for Discharge  Goal: I will remain free of falls  Outcome: Adequate for Discharge     Problem: Daily Care  Goal: Daily care needs are met  Outcome: Adequate for Discharge     Problem: Psychosocial Needs  Goal: Demonstrates ability to cope with hospitalization/illness  Outcome: Adequate for Discharge  Goal: Collaborate with me, my family, and caregiver to identify my specific goals  Outcome: Adequate for Discharge     Problem: Discharge Barriers  Goal: My discharge needs are met  Outcome: Adequate for Discharge

## 2024-06-01 NOTE — NURSING NOTE

## 2024-06-01 NOTE — DISCHARGE SUMMARY
Discharge Diagnosis  Uterovaginal prolapse    Issues Requiring Follow-Up  thrombocytopenia    Test Results Pending At Discharge  Pending Labs       No current pending labs.            Hospital Course  Had lefort colpocleisis, posterior repair and TVT on 5/31/24. Admitted overnight due to vaginal bleeding that was managed with vaginal packing.   POD1 meeting post op goals and minimal bleeding with packing removed.       Pertinent Physical Exam At Time of Discharge  Physical Exam  HENT:      Head: Normocephalic.      Mouth/Throat:      Mouth: Mucous membranes are moist.   Eyes:      Pupils: Pupils are equal, round, and reactive to light.   Cardiovascular:      Rate and Rhythm: Normal rate.   Pulmonary:      Effort: Pulmonary effort is normal.   Abdominal:      General: Abdomen is flat.   Genitourinary:     Comments: Saturated TVT exit bandages removed and replaced  No bruising on the mons    Packing pulled- saturated. No active bleeding  No bruising of the labia  Musculoskeletal:         General: Normal range of motion.      Cervical back: Normal range of motion.   Skin:     General: Skin is warm.      Findings: Bruising present.   Neurological:      Mental Status: She is alert.   Psychiatric:         Mood and Affect: Mood normal.         Home Medications     Medication List      START taking these medications     ibuprofen 600 mg tablet; Take 1 tablet (600 mg) by mouth every 6 hours   if needed for mild pain (1 - 3) for up to 7 days.   oxyCODONE 5 mg immediate release tablet; Commonly known as: Roxicodone;   Take 1 tablet (5 mg) by mouth every 6 hours if needed for severe pain (7 -   10) for up to 3 days.   polyethylene glycol 17 gram packet; Commonly known as: Glycolax,   Miralax; Take 17 g by mouth once daily.     CONTINUE taking these medications     acetaminophen 325 mg tablet; Commonly known as: Tylenol   ferrous sulfate 325 (65 Fe) MG EC tablet   ICAPS AREDS ORAL   potassium chloride CR 10 mEq ER tablet;  Commonly known as: Klor-Con;   Take 1 tablet (10 mEq) by mouth 2 times a day.   triamterene-hydrochlorothiazid 75-50 mg tablet; Commonly known as:   Maxzide; Take 1 tablet by mouth once daily in the morning. Take before   meals.   ursodiol 500 mg tablet; Commonly known as: Actigall   Vitamin D3 25 MCG (1000 UT) capsule; Generic drug: cholecalciferol       Outpatient Follow-Up  Future Appointments   Date Time Provider Department Center   6/13/2024  3:00 PM TANA MENTOR CT W[facility]CT [facility]    6/27/2024 10:45 AM Angeles Whittaker MD [id number] [facility]   7/11/2024  2:00 PM Helena Pfeiffer, APRN-CNP [id number] [facility]       Nichol Jackson MD MPH

## 2024-06-01 NOTE — CARE PLAN
Problem: Pain  Goal: My pain/discomfort is manageable  Outcome: Progressing  Flowsheets (Taken 5/31/2024 2122)  Resident's pain/discomfort is manageable:   Include resident/family/caregiver in decisions related to pain management   Offer non-pharmacological pain management interventions   Administer pain medication prior to activities that may trigger pain   Identify and avoid pain triggers     Problem: Safety  Goal: Patient will be injury free during hospitalization  Outcome: Progressing  Goal: I will remain free of falls  Outcome: Progressing  Flowsheets (Taken 5/31/2024 2122)  Resident will remain free of falls:   Assist with toileting as orderd   Apply bed/chair alarms as appropriate   Maintain bed at position as ordered (chair height, low bed)   Consult with physical therapy as needed     Problem: Daily Care  Goal: Daily care needs are met  Outcome: Progressing  Flowsheets (Taken 5/31/2024 2122)  Daily care needs are met:   Assist patient with activities of daily living as needed   Assess and monitor ability to perform self care and identify potential discharge needs   Encourage independent activity per ability   Assess skin integrity/risk for skin breakdown     Problem: Psychosocial Needs  Goal: Demonstrates ability to cope with hospitalization/illness  Outcome: Progressing  Flowsheets (Taken 5/31/2024 2122)  Demonstrates ability to cope with hospitalization/illness:   Assist resident to identify and practice own strengths and abilities   Encourage verbalization of feelings/concerns/expectations   Reinforce positive adaptation of new coping behaviors  Goal: Collaborate with me, my family, and caregiver to identify my specific goals  Outcome: Progressing     Problem: Discharge Barriers  Goal: My discharge needs are met  Outcome: Progressing  Flowsheets (Taken 5/31/2024 2122)  Resident's discharge needs are met:   Involve resident/family/caregiver in discharge planning process   Identify potential discharge  barriers on admission and throughout stay   The patient's goals for the shift include Rest and go home tomorrow      The clinical goals for the shift include

## 2024-06-01 NOTE — PROGRESS NOTES
"Jessi Hallman is a 74 y.o. female on day 0 of admission presenting with Uterovaginal prolapse. POD#1 from lefort colpocleisis, TVT and posterior repair.    Subjective   She is feeling well this morning, no issues. Tolerating PO, has not yet ambulated. Has no pain. Denies n/v. No fevers or chills. No dizziness.   Pack in place and she can feel it but it is not bothering her.        Objective     Physical Exam  Vitals reviewed.   HENT:      Head: Normocephalic.      Nose: Nose normal.      Mouth/Throat:      Mouth: Mucous membranes are moist.   Cardiovascular:      Rate and Rhythm: Normal rate.   Pulmonary:      Effort: Pulmonary effort is normal.      Comments: Right basilar crackles  Abdominal:      General: Abdomen is flat.   Genitourinary:     Comments: Island dressings over the TVT exit sites are saturated- these were removed.     Vaginal packing was removed- this was saturated as was about 75% of her pad.   No active bleeding noted after removal of the pack.   Musculoskeletal:         General: Normal range of motion.      Cervical back: Normal range of motion.   Skin:     Findings: Bruising present.   Neurological:      Mental Status: She is alert.         Last Recorded Vitals  Blood pressure 124/75, pulse 70, temperature 36.7 °C (98.1 °F), resp. rate 18, height 1.651 m (5' 5\"), weight 92.6 kg (204 lb 2.3 oz), SpO2 97%.  Intake/Output last 3 Shifts:  I/O last 3 completed shifts:  In: 1943 (21 mL/kg) [P.O.:400; I.V.:1543 (16.7 mL/kg)]  Out: 1100 (11.9 mL/kg) [Urine:1000 (0.3 mL/kg/hr); Blood:100]  Weight: 92.6 kg     Relevant Results  Results for orders placed or performed during the hospital encounter of 05/31/24 (from the past 24 hour(s))   Type And Screen   Result Value Ref Range    ABO TYPE O     Rh TYPE POS     ANTIBODY SCREEN NEG    CBC   Result Value Ref Range    WBC 4.4 4.4 - 11.3 x10*3/uL    nRBC 0.0 0.0 - 0.0 /100 WBCs    RBC 3.92 (L) 4.00 - 5.20 x10*6/uL    Hemoglobin 12.2 12.0 - 16.0 g/dL    " Hematocrit 38.0 36.0 - 46.0 %    MCV 97 80 - 100 fL    MCH 31.1 26.0 - 34.0 pg    MCHC 32.1 32.0 - 36.0 g/dL    RDW 14.4 11.5 - 14.5 %    Platelets 53 (L) 150 - 450 x10*3/uL      Scheduled medications  polyethylene glycol, 17 g, oral, Daily  potassium chloride CR, 10 mEq, oral, BID  triamterene-hydrochlorothiazid, 2 tablet, oral, Daily  ursodiol, 600 mg, oral, BID      Continuous medications  lactated Ringer's, 100 mL/hr, Last Rate: 100 mL/hr (06/01/24 8548)      PRN medications  PRN medications: acetaminophen, ibuprofen, metoclopramide **OR** metoclopramide, oxyCODONE, phenazopyridine             Assessment/Plan   Principal Problem:    Uterovaginal prolapse  Active Problems:    Postoperative pain    75 yo with hx of thrombocytopenia due to cirrhosis/autoimmune disease, now POD#1 from lefort colpocleisis, TVT and posterior repair admitted for observation overnight for vaginal bleeding    Thrombocytopenia/vaginal bleeding  - packing removed, no active bleeding at this time  - reviewed bleeding precautions- call office number and return to ED if soaking pads in 1-2 hours.   - reviewed sx of anemia.     Pain  - controlled on PO meds. Discussed avoiding ibuprofen due to bleeding  - will rx tylenol and oxycodone prn for pain control     FEN/GI  - tolerating PO  - miralax until first BM and thereafter to avoid constipation      - plan for void trial this morning    Plan for discharge home today    Follow up in clinic in 2 weeks or sooner PRN             Nichol Jackson MD MPH

## 2024-06-01 NOTE — TELEPHONE ENCOUNTER
"Call to patient for follow up from surgery yesterday. She states that she just got home, because she had \"so much bleeding that I had to stay overnight\". She said that she just got home and the bleeding has decreased. She is feeling \"OK\". She is eating and drinking well. She is using tylenol for pain, but does have oxycodone also and may take at bedtime. She has not had a bowel movement. I told her that I would check in with her tomorrow.   "

## 2024-06-02 ENCOUNTER — TELEPHONE (OUTPATIENT)
Dept: OBSTETRICS AND GYNECOLOGY | Facility: CLINIC | Age: 74
End: 2024-06-02
Payer: MEDICARE

## 2024-06-02 NOTE — TELEPHONE ENCOUNTER
"Call to patient today for follow up from surgery on 5/31. She states that she is feeling \"much better\". Still getting pain relief from tylenol only. Did take oxycodone last night and slept very well. Is urinating without problem. No bowel movement yet. Is taking the miralax. Per Dr Jackson's guidelines, try milk of magnesia tomorrow if no bowel movement. Told her to drink hot beverages, like tea, and add some honey. She agrees to try that. Told to call me back for any questions or concerns.   "

## 2024-06-03 ASSESSMENT — PAIN SCALES - GENERAL: PAINLEVEL_OUTOF10: 2

## 2024-06-11 ENCOUNTER — OFFICE VISIT (OUTPATIENT)
Dept: OBSTETRICS AND GYNECOLOGY | Facility: CLINIC | Age: 74
End: 2024-06-11
Payer: MEDICARE

## 2024-06-11 VITALS
HEIGHT: 65 IN | SYSTOLIC BLOOD PRESSURE: 154 MMHG | WEIGHT: 205 LBS | BODY MASS INDEX: 34.16 KG/M2 | DIASTOLIC BLOOD PRESSURE: 78 MMHG

## 2024-06-11 DIAGNOSIS — D69.6 THROMBOCYTOPENIA (CMS-HCC): Primary | ICD-10-CM

## 2024-06-11 PROCEDURE — 99024 POSTOP FOLLOW-UP VISIT: CPT | Performed by: OBSTETRICS & GYNECOLOGY

## 2024-06-11 PROCEDURE — 3078F DIAST BP <80 MM HG: CPT | Performed by: OBSTETRICS & GYNECOLOGY

## 2024-06-11 PROCEDURE — 1159F MED LIST DOCD IN RCRD: CPT | Performed by: OBSTETRICS & GYNECOLOGY

## 2024-06-11 PROCEDURE — 3077F SYST BP >= 140 MM HG: CPT | Performed by: OBSTETRICS & GYNECOLOGY

## 2024-06-11 NOTE — PROGRESS NOTES
[facility] Department of Urogynecology   Nichol Jackson MD, MPH   [phone]    ASSESSMENT AND PLAN:   74 y.o. female presenting in postop s/p Lefort colpocleisis, OH, MUS, cystoscopy for uterovaginal prolapse and LUMA. She has hx of rectal prolapse and UUI. Comorbidities include: HTN, hx of lung cancer, biliary cirrhosis, cytopenia, and h/o splenomegaly.       Diagnoses:   #1 Uterovaginal prolapse, postop     #2 LUMA, postop   #3 UUI   #4 Rectal prolapse       Plan:   1. Uterovaginal prolapse, LUMA, postop  - 5/31/24: Lefort colpocleisis, OH, MUS, cystoscopy. She has thrombocytopenia and had postop bleeding, so she was kept overnight.  - Healing well on exam without issue. No return of POP.      2. UUI  - We will re evaluate her symptoms at 1 month after she's had time to heal. If she still has UUI, we will discuss a medication.     3. Rectal prolapse   - Patient was having bleeding from her rectal prolapse when she had BMs. No bleeding from the vagina on exam.    - She discontinued Miralax due to diarrhea, which is fine as long as she's having soft BMs without straining.      Follow-up in 1 month with Dr. Jackson.     Scribe Attestation:   IRamona, am scribing for virtually, and in the presence of Nichol Jackson MD MPH on 6/11/24 at 7:50 PM.     Problem List Items Addressed This Visit    None  Visit Diagnoses       Thrombocytopenia ([facility])    -  Primary           I Dr. Jackson, personally performed the services described in the documentation as scribed in my presence and confirm it is both complete and accurate.  Nichol Jackson MD, MPH, FACOG      Established    HISTORY OF PRESENT ILLNESS:     Jessi Hallman is a 74 y.o. female who presents for her first in clinic postoperative visit.     Record Review:   - 5/31/24: Lefort colpocleisis, OH, MUS, cystoscopy. She has thrombocytopenia and had postop bleeding, so she was kept overnight. Patient was seen the next day and was doing well.      Postop Symptoms:  - Today was the first day she had a BM without blood. The water in the toilet would be red and she would occasionally see clots.   - She says she stopped Miralax and had a normal BM without straining. Prior to that she was having diarrhea with blood.   - Patient feels certain the blood was coming from the rectum, not the vagina because when she wiped the blood was near the rectum instead of the vagina.   - No pain.   - Denies f/c/n/v.   - No abnormal discharge.   - Feels she empties her bladder better after surgery.      Urinary Urge Symptoms:   - She leaked urine today when she had the urge to void while brushing her teeth.   - Her pads are never completely dry and 3x since surgery she had to change her pads after completely leaking through the pads + underwear. This once happened when she was walking to the bathroom at night.   - Voids 6x daily.   - Nocturia 2x per night.   - She leaks more when she is sitting, but this improves with laying down.        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)      [facility]; observation yearly; stable since 2017    Osteopenia     Portal hypertension (Multi)     Ocampo    Primary biliary cirrhosis (Multi)     Ocampo    Splenomegaly     Ocampo; heme-onc    Thyroid nodule     Shayla: benign bx's x 2          Past Surgical History:     Past Surgical History:   Procedure Laterality Date    CATARACT EXTRACTION Left 10/2022    COLONOSCOPY  06/2021    + polyps/nelida 5 yrs; [facility]    CT GUIDED PERCUTANEOUS BIOPSY LUNG  05/10/2021    + cancerous/carcinoid  [facility]    ESOPHAGOGASTRODUODENOSCOPY  06/2021    eso varices; portal htn gastropathy; Ocampo    URETHRAL SLING N/A 05/31/2024    retropubic midurethral; andvantage fit blue    US GUIDED PERCUTANEOUS  "PLACEMENT  10/26/2018    US GUIDED PERCUTANEOUS PLACEMENT [facility]    VAGINAL PROLAPSE REPAIR N/A 05/31/2024    Lefort colpocleisis and posterior repair/perineorrhaphy         Medications:     Prior to Admission medications    Medication Sig Start Date End Date Taking? Authorizing Provider   acetaminophen (Tylenol) 325 mg tablet Take 2 tablets (650 mg) by mouth every 6 hours if needed for mild pain (1 - 3).    Historical Provider, MD   cholecalciferol (Vitamin D3) 25 MCG (1000 UT) capsule 1 capsule (25 mcg) once every 24 hours.    Historical Provider, MD   ferrous sulfate 325 (65 Fe) MG EC tablet once every 24 hours.    Historical Provider, MD   polyethylene glycol (Glycolax, Miralax) 17 gram packet Take 17 g by mouth once daily. 5/31/24 6/30/24  Martha Hightower MD   potassium chloride CR 10 mEq ER tablet Take 1 tablet (10 mEq) by mouth 2 times a day. 1/4/24 7/2/24  Angeles Whittaker MD   triamterene-hydrochlorothiazid (Maxzide) 75-50 mg tablet Take 1 tablet by mouth once daily in the morning. Take before meals. 1/4/24 7/2/24  Angeles Whittaker MD   ursodiol (Actigall) 500 mg tablet Take 1 tablet (500 mg) by mouth 2 times a day.    Historical Provider, MD   vit A/vit C/vit E/zinc/copper (ICAPS AREDS ORAL) once every 24 hours.    Historical Provider, MD MCALLISTER  Review of Systems       PHYSICAL EXAM:    /78   Ht 1.651 m (5' 5\")   Wt 93 kg (205 lb)   BMI 34.11 kg/m²   No LMP recorded. Patient is postmenopausal.    Declines chaperone for physical exam.      Well developed, well nourished, in no apparent distress.   Neurologic/Psychiatric:  Awake, Alert and Oriented times 3.  Affect normal.     GENITAL/URINARY:     External Genitalia:  The patient has normal appearing external genitalia, normal skenes and bartholins glands, and a normal hair distribution. It is non-tender with appropriate sensation. Bruising at sling exit points.     Urethral Meatus:  Size normal, Location normal, Lesions absent, " "Prolapse absent.    Urethra:  Fullness absent, Masses absent. No leakage with cough, but there was urine when patient moved down the exam table.     Bladder:  Fullness absent, Masses absent, Tenderness absent.    Vagina:  General appearance normal, Discharge absent, Lesions absent. No bleeding. No mesh erosion seen or palpated. Posterior wall is well approximated.     Anus/Perineum:  defer exam, rectal prolapse   Normal Perineum      Physical Exam  Genitourinary:   POP-Q measurements were:      Aa: -3, Ba: -3, C:     gH: 2, pB: 4, TVL: 3     Ap: -3, Bp: -3, D:          Data and DIAGNOSTIC STUDIES REVIEWED   No results found.   No results found for: \"URINECULTURE\", \"UAMICCOMM\"   Lab Results   Component Value Date    GLUCOSE 96 05/02/2024    CALCIUM 9.5 05/02/2024     05/02/2024    K 3.9 05/02/2024    CO2 29 05/02/2024     05/02/2024    BUN 22 05/02/2024    CREATININE 0.90 05/02/2024     Lab Results   Component Value Date    WBC 4.4 06/01/2024    HGB 12.2 06/01/2024    HCT 38.0 06/01/2024    MCV 97 06/01/2024    PLT 53 (L) 06/01/2024        "

## 2024-06-13 ENCOUNTER — HOSPITAL ENCOUNTER (OUTPATIENT)
Dept: RADIOLOGY | Facility: CLINIC | Age: 74
End: 2024-06-13
Payer: MEDICARE

## 2024-06-13 ENCOUNTER — HOSPITAL ENCOUNTER (OUTPATIENT)
Dept: RADIOLOGY | Facility: HOSPITAL | Age: 74
Discharge: HOME | End: 2024-06-13
Payer: MEDICARE

## 2024-06-13 DIAGNOSIS — C7A.8 OTHER MALIGNANT NEUROENDOCRINE TUMORS (MULTI): ICD-10-CM

## 2024-06-13 PROCEDURE — 71250 CT THORAX DX C-: CPT

## 2024-06-20 ENCOUNTER — APPOINTMENT (OUTPATIENT)
Dept: HEMATOLOGY/ONCOLOGY | Facility: CLINIC | Age: 74
End: 2024-06-20
Payer: MEDICARE

## 2024-06-27 ENCOUNTER — OFFICE VISIT (OUTPATIENT)
Dept: PRIMARY CARE | Facility: CLINIC | Age: 74
End: 2024-06-27
Payer: MEDICARE

## 2024-06-27 ENCOUNTER — LAB (OUTPATIENT)
Dept: LAB | Facility: LAB | Age: 74
End: 2024-06-27
Payer: MEDICARE

## 2024-06-27 VITALS
BODY MASS INDEX: 34.75 KG/M2 | SYSTOLIC BLOOD PRESSURE: 128 MMHG | WEIGHT: 208.8 LBS | DIASTOLIC BLOOD PRESSURE: 76 MMHG | OXYGEN SATURATION: 97 % | HEART RATE: 86 BPM

## 2024-06-27 DIAGNOSIS — I85.10 SECONDARY ESOPHAGEAL VARICES WITHOUT BLEEDING (MULTI): ICD-10-CM

## 2024-06-27 DIAGNOSIS — J44.9 CHRONIC OBSTRUCTIVE PULMONARY DISEASE, UNSPECIFIED COPD TYPE (MULTI): ICD-10-CM

## 2024-06-27 DIAGNOSIS — I10 PRIMARY HYPERTENSION: ICD-10-CM

## 2024-06-27 DIAGNOSIS — E78.2 MIXED HYPERLIPIDEMIA: ICD-10-CM

## 2024-06-27 DIAGNOSIS — C7B.00: ICD-10-CM

## 2024-06-27 DIAGNOSIS — I10 PRIMARY HYPERTENSION: Primary | ICD-10-CM

## 2024-06-27 DIAGNOSIS — K74.3 HEPATIC CIRRHOSIS DUE TO PRIMARY BILIARY CHOLANGITIS (MULTI): Primary | ICD-10-CM

## 2024-06-27 LAB
APPEARANCE UR: CLEAR
BACTERIA #/AREA URNS AUTO: ABNORMAL /HPF
BILIRUB UR STRIP.AUTO-MCNC: NEGATIVE MG/DL
COLOR UR: YELLOW
GLUCOSE UR STRIP.AUTO-MCNC: NORMAL MG/DL
KETONES UR STRIP.AUTO-MCNC: NEGATIVE MG/DL
LEUKOCYTE ESTERASE UR QL STRIP.AUTO: ABNORMAL
MUCOUS THREADS #/AREA URNS AUTO: ABNORMAL /LPF
NITRITE UR QL STRIP.AUTO: NEGATIVE
PH UR STRIP.AUTO: 6 [PH]
PROT UR STRIP.AUTO-MCNC: NEGATIVE MG/DL
RBC # UR STRIP.AUTO: NEGATIVE /UL
RBC #/AREA URNS AUTO: ABNORMAL /HPF
SP GR UR STRIP.AUTO: 1.01
SQUAMOUS #/AREA URNS AUTO: ABNORMAL /HPF
UROBILINOGEN UR STRIP.AUTO-MCNC: NORMAL MG/DL
WBC #/AREA URNS AUTO: ABNORMAL /HPF

## 2024-06-27 PROCEDURE — 1159F MED LIST DOCD IN RCRD: CPT | Performed by: FAMILY MEDICINE

## 2024-06-27 PROCEDURE — 1126F AMNT PAIN NOTED NONE PRSNT: CPT | Performed by: FAMILY MEDICINE

## 2024-06-27 PROCEDURE — 82043 UR ALBUMIN QUANTITATIVE: CPT

## 2024-06-27 PROCEDURE — 3074F SYST BP LT 130 MM HG: CPT | Performed by: FAMILY MEDICINE

## 2024-06-27 PROCEDURE — 99214 OFFICE O/P EST MOD 30 MIN: CPT | Performed by: FAMILY MEDICINE

## 2024-06-27 PROCEDURE — 3078F DIAST BP <80 MM HG: CPT | Performed by: FAMILY MEDICINE

## 2024-06-27 PROCEDURE — 1036F TOBACCO NON-USER: CPT | Performed by: FAMILY MEDICINE

## 2024-06-27 PROCEDURE — 1158F ADVNC CARE PLAN TLK DOCD: CPT | Performed by: FAMILY MEDICINE

## 2024-06-27 PROCEDURE — 82570 ASSAY OF URINE CREATININE: CPT

## 2024-06-27 PROCEDURE — 1123F ACP DISCUSS/DSCN MKR DOCD: CPT | Performed by: FAMILY MEDICINE

## 2024-06-27 PROCEDURE — 1160F RVW MEDS BY RX/DR IN RCRD: CPT | Performed by: FAMILY MEDICINE

## 2024-06-27 RX ORDER — POTASSIUM CHLORIDE 750 MG/1
10 TABLET, FILM COATED, EXTENDED RELEASE ORAL 2 TIMES DAILY
Qty: 180 TABLET | Refills: 1 | Status: SHIPPED | OUTPATIENT
Start: 2024-06-27 | End: 2024-12-24

## 2024-06-27 RX ORDER — TRIAMTERENE AND HYDROCHLOROTHIAZIDE 75; 50 MG/1; MG/1
1 TABLET ORAL
Qty: 90 TABLET | Refills: 1 | Status: SHIPPED | OUTPATIENT
Start: 2024-06-27 | End: 2024-12-24

## 2024-06-27 ASSESSMENT — PATIENT HEALTH QUESTIONNAIRE - PHQ9
SUM OF ALL RESPONSES TO PHQ9 QUESTIONS 1 AND 2: 0
2. FEELING DOWN, DEPRESSED OR HOPELESS: NOT AT ALL
1. LITTLE INTEREST OR PLEASURE IN DOING THINGS: NOT AT ALL

## 2024-06-27 ASSESSMENT — PAIN SCALES - GENERAL: PAINLEVEL: 0-NO PAIN

## 2024-06-28 PROBLEM — R80.9 ALBUMINURIA: Status: ACTIVE | Noted: 2024-06-28

## 2024-06-28 LAB
CREAT UR-MCNC: 63.8 MG/DL (ref 20–320)
MICROALBUMIN UR-MCNC: 43.3 MG/L
MICROALBUMIN/CREAT UR: 67.9 UG/MG CREAT

## 2024-07-01 ENCOUNTER — OFFICE VISIT (OUTPATIENT)
Dept: PRIMARY CARE | Facility: CLINIC | Age: 74
End: 2024-07-01
Payer: MEDICARE

## 2024-07-01 VITALS
DIASTOLIC BLOOD PRESSURE: 72 MMHG | OXYGEN SATURATION: 95 % | HEART RATE: 62 BPM | BODY MASS INDEX: 34.55 KG/M2 | SYSTOLIC BLOOD PRESSURE: 134 MMHG | WEIGHT: 207.6 LBS

## 2024-07-01 DIAGNOSIS — K11.1 ENLARGED PAROTID GLAND: Primary | ICD-10-CM

## 2024-07-01 DIAGNOSIS — R60.0 SUBMANDIBULAR GLAND SWELLING: ICD-10-CM

## 2024-07-01 PROCEDURE — 1125F AMNT PAIN NOTED PAIN PRSNT: CPT | Performed by: FAMILY MEDICINE

## 2024-07-01 PROCEDURE — 1036F TOBACCO NON-USER: CPT | Performed by: FAMILY MEDICINE

## 2024-07-01 PROCEDURE — 99213 OFFICE O/P EST LOW 20 MIN: CPT | Performed by: FAMILY MEDICINE

## 2024-07-01 PROCEDURE — 1123F ACP DISCUSS/DSCN MKR DOCD: CPT | Performed by: FAMILY MEDICINE

## 2024-07-01 PROCEDURE — 3075F SYST BP GE 130 - 139MM HG: CPT | Performed by: FAMILY MEDICINE

## 2024-07-01 PROCEDURE — 3078F DIAST BP <80 MM HG: CPT | Performed by: FAMILY MEDICINE

## 2024-07-01 PROCEDURE — 1159F MED LIST DOCD IN RCRD: CPT | Performed by: FAMILY MEDICINE

## 2024-07-01 RX ORDER — AMOXICILLIN AND CLAVULANATE POTASSIUM 875; 125 MG/1; MG/1
875 TABLET, FILM COATED ORAL 2 TIMES DAILY
Qty: 20 TABLET | Refills: 0 | Status: SHIPPED | OUTPATIENT
Start: 2024-07-01 | End: 2024-07-11

## 2024-07-01 ASSESSMENT — PAIN SCALES - GENERAL: PAINLEVEL: 5

## 2024-07-01 ASSESSMENT — PATIENT HEALTH QUESTIONNAIRE - PHQ9
1. LITTLE INTEREST OR PLEASURE IN DOING THINGS: NOT AT ALL
2. FEELING DOWN, DEPRESSED OR HOPELESS: NOT AT ALL
SUM OF ALL RESPONSES TO PHQ9 QUESTIONS 1 AND 2: 0

## 2024-07-01 NOTE — PROGRESS NOTES
Subjective   Patient ID: Jessi Hallman is a 74 y.o. female who presents for Jaw Pain.    HPI   Pt comes in with swelling of the right side of her jaw for the past 5 days.  She's had a previous parotid gland infection back in 2018 that required abx and she thinks this is the same.   She says by the end of the day the swelling involves the upper part of her right jaw not just the bottom and it is tender.  No foul taste in mouth.    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      cholecalciferol (Vitamin D3) 25 MCG (1000 UT) capsule 1 capsule (25 mcg) once every 24 hours.      potassium chloride CR 10 mEq ER tablet Take 1 tablet (10 mEq) by mouth 2 times a day. 180 tablet 1    triamterene-hydrochlorothiazid (Maxzide) 75-50 mg tablet Take 1 tablet by mouth once daily in the morning. Take before meals. 90 tablet 1    ursodiol (Actigall) 500 mg tablet Take 1 tablet (500 mg) by mouth 2 times a day.      vit A/vit C/vit E/zinc/copper (ICAPS AREDS ORAL) once every 24 hours.       No current facility-administered medications for this visit.       Review of Systems see hpi    Objective   /72   Pulse 62   Wt 94.2 kg (207 lb 9.6 oz)   SpO2 95%   BMI 34.55 kg/m²     Physical Exam  HENT:      Head:      Jaw: Tenderness and swelling present. No trismus.      Comments: Slight swelling and  tenderness under right mandible in area of submandibular salivary gland.  Pt also tender over right TMJ area      Mouth/Throat:      Mouth: Mucous membranes are moist.      Pharynx: Oropharynx is clear.      Comments: No pus noted at salivary opening of parotid gland        Assessment/Plan   Problem List Items Addressed This Visit    None  Visit Diagnoses         Codes    Enlarged parotid gland    -  Primary K11.1    Relevant Orders    US head neck soft tissue    Submandibular gland swelling     R60.0    Relevant Orders    US head neck soft tissue

## 2024-07-03 ENCOUNTER — HOSPITAL ENCOUNTER (OUTPATIENT)
Dept: RADIOLOGY | Facility: HOSPITAL | Age: 74
Discharge: HOME | End: 2024-07-03
Payer: MEDICARE

## 2024-07-03 ENCOUNTER — LAB (OUTPATIENT)
Dept: LAB | Facility: LAB | Age: 74
End: 2024-07-03
Payer: MEDICARE

## 2024-07-03 DIAGNOSIS — K11.1 ENLARGED PAROTID GLAND: ICD-10-CM

## 2024-07-03 DIAGNOSIS — K74.3 HEPATIC CIRRHOSIS DUE TO PRIMARY BILIARY CHOLANGITIS (MULTI): ICD-10-CM

## 2024-07-03 DIAGNOSIS — R60.0 SUBMANDIBULAR GLAND SWELLING: ICD-10-CM

## 2024-07-03 LAB
ALBUMIN SERPL BCP-MCNC: 3 G/DL (ref 3.4–5)
ALP SERPL-CCNC: 126 U/L (ref 33–136)
ALT SERPL W P-5'-P-CCNC: 13 U/L (ref 7–45)
AST SERPL W P-5'-P-CCNC: 24 U/L (ref 9–39)
BILIRUB DIRECT SERPL-MCNC: 0.4 MG/DL (ref 0–0.3)
BILIRUB SERPL-MCNC: 2.3 MG/DL (ref 0–1.2)
PROT SERPL-MCNC: 6 G/DL (ref 6.4–8.2)

## 2024-07-03 PROCEDURE — 76536 US EXAM OF HEAD AND NECK: CPT

## 2024-07-03 PROCEDURE — 36415 COLL VENOUS BLD VENIPUNCTURE: CPT

## 2024-07-03 PROCEDURE — 76536 US EXAM OF HEAD AND NECK: CPT | Performed by: RADIOLOGY

## 2024-07-03 PROCEDURE — 76705 ECHO EXAM OF ABDOMEN: CPT | Performed by: RADIOLOGY

## 2024-07-03 PROCEDURE — 76705 ECHO EXAM OF ABDOMEN: CPT

## 2024-07-03 PROCEDURE — 80076 HEPATIC FUNCTION PANEL: CPT

## 2024-07-05 NOTE — RESULT ENCOUNTER NOTE
There is a solid lesion in right parotid/salivary gland.  Rec neck CT to look at it better.  If she is agreeable ,will order

## 2024-07-08 DIAGNOSIS — R93.89 ABNORMAL ULTRASOUND OF NECK: Primary | ICD-10-CM

## 2024-07-08 DIAGNOSIS — Z01.812 ENCOUNTER FOR PREPROCEDURAL LABORATORY EXAMINATION: ICD-10-CM

## 2024-07-09 ENCOUNTER — APPOINTMENT (OUTPATIENT)
Dept: OBSTETRICS AND GYNECOLOGY | Facility: CLINIC | Age: 74
End: 2024-07-09
Payer: MEDICARE

## 2024-07-09 VITALS
WEIGHT: 205 LBS | DIASTOLIC BLOOD PRESSURE: 66 MMHG | SYSTOLIC BLOOD PRESSURE: 136 MMHG | HEIGHT: 65 IN | BODY MASS INDEX: 34.16 KG/M2

## 2024-07-09 DIAGNOSIS — N39.46 MIXED STRESS AND URGE INCONTINENCE: Primary | ICD-10-CM

## 2024-07-09 PROCEDURE — 1159F MED LIST DOCD IN RCRD: CPT | Performed by: OBSTETRICS & GYNECOLOGY

## 2024-07-09 PROCEDURE — 3078F DIAST BP <80 MM HG: CPT | Performed by: OBSTETRICS & GYNECOLOGY

## 2024-07-09 PROCEDURE — 99024 POSTOP FOLLOW-UP VISIT: CPT | Performed by: OBSTETRICS & GYNECOLOGY

## 2024-07-09 PROCEDURE — 1123F ACP DISCUSS/DSCN MKR DOCD: CPT | Performed by: OBSTETRICS & GYNECOLOGY

## 2024-07-09 PROCEDURE — 3075F SYST BP GE 130 - 139MM HG: CPT | Performed by: OBSTETRICS & GYNECOLOGY

## 2024-07-10 ENCOUNTER — LAB (OUTPATIENT)
Dept: LAB | Facility: LAB | Age: 74
End: 2024-07-10
Payer: MEDICARE

## 2024-07-10 ENCOUNTER — HOSPITAL ENCOUNTER (OUTPATIENT)
Dept: RADIOLOGY | Facility: HOSPITAL | Age: 74
Discharge: HOME | End: 2024-07-10
Payer: MEDICARE

## 2024-07-10 DIAGNOSIS — Z01.812 ENCOUNTER FOR PREPROCEDURAL LABORATORY EXAMINATION: ICD-10-CM

## 2024-07-10 DIAGNOSIS — R93.89 ABNORMAL ULTRASOUND OF NECK: ICD-10-CM

## 2024-07-10 LAB
CREAT SERPL-MCNC: 0.73 MG/DL (ref 0.5–1.05)
EGFRCR SERPLBLD CKD-EPI 2021: 85 ML/MIN/1.73M*2

## 2024-07-10 PROCEDURE — 82565 ASSAY OF CREATININE: CPT

## 2024-07-10 PROCEDURE — 36415 COLL VENOUS BLD VENIPUNCTURE: CPT

## 2024-07-10 NOTE — PROGRESS NOTES
"POST OPERATIVE VISIT  The patient is a 74 y.o. female who presents for a postoperative follow up visit.    She underwent LeFort colpocleisis, AR/perineorrhaphy, midurethral sling, and cystoscopy on 5/31/2024 and is now 5 week(s) postop.    Record Review:  - 6/11/2024 Dr. Jackson note Re: Doing well postoperatively and healing well. No return of POP or LUMA symptoms. For urinary urgency, we will give this x1 month time to continue healing process and if not improved will start OAB medication. Rectal prolapse, she was bleeding during bowel movements and no VB noted. She discontinued using MiraLAX.   - 5/31/2024: Lefort colpocleisis, AR, MUS, cystoscopy. She has thrombocytopenia and had postop bleeding, so she was kept overnight. Patient was seen the next day and was doing well.       INTERVAL HISTORY:  Postoperative Symptoms and Recovery:   - She reports having other medical issues going on with parotid gland adenopathy and lung lesions followed by oncology. She is planning to see CRS for management of her rectal prolapse.   - She endorses improvement in her urinary urgency since her last visit x1 month ago and drinks a lot of water daily. However, she notes the more water she drinks the worse her urgency is.   - LUMA does occur with coughing sometimes but this is improved since TVT was placed.  - Patient continues to wear 3-4 pads for urinary leakage related to coughing.   - Denies any return of POP or vaginal bulge.   - No vaginal bleeding, pain, constipation, fevers, chills, nausea, and vomiting.       PHYSICAL EXAM:  /66   Ht 1.651 m (5' 5\")   Wt 93 kg (205 lb)   BMI 34.11 kg/m²   Urine dip: No results found for this or any previous visit.     General Appearance: well developed, good nutrition, well groomed, no deformities, normal habitus  Head: normocephalic, and atraumatic  Neck: symmetric, midline trachea,  full range of motion  Respiratory: no dyspnea, negative for intercostal retraction or uses of " accessory muscles of respiration  Cardiovascular: peripheral pulses are normal, no swelling, edema or varicosities  Abdomen: Abdomen soft, non-tender, non-distended.     Pelvic:  CST:  Negative in the supine position  External genitalia: normal appearance, normal Bartholin's glands and Sombrillo's glands   Urethra: normal meatus, non-tender, no periurethral mass,  no evidence of exposed mesh from the midurethral sling visualized or palpated, well-healing midurethra and well-healed sling exit sites over the mons.   Vaginal mucosa: normal, no granulation tissue, no strictures, posterior vaginal wall incisions well-healed, no evidence of exposed mesh from the TVT. Perineal body is well approximated, incision is C/D/I.     POP-Q (in supine position): No prolapse past the hymen       Aa -2     Ba -2     C -2              gh 4     pb 4     tvl 2.5              Ap -2     Bp -2     D x    Rectum: Rectal prolapse noted    Impression/plan:  74 year old female presenting in postoperative follow up s/p LeFort colpocleisis, KS/perineorrhaphy, midurethral sling, and cystoscopy on 5/31/2024 for uterovaginal prolapse and LUMA.     Diagnoses:  #1 Uterovaginal prolapse (resolved)   #2 Stress urinary incontinence (improved)   #3 Rectal prolapse     Plan:  1. POP, LUMA, postop  - 5/31/2024 surgery: LeFort colpocleisis, KS/perineorrhaphy, midurethral sling, and cystoscopy.  - She is doing well postoperatively and is healing normally.   - Preoperative POP symptoms resolved.   - She continues to endorse LUMA with coughing occasionally but this has improved since TVT was placed and wishes to give this more time to heal in the postoperative recovery period. If LUMA persists, we will consider Bulkamid procedure in the future.   - Released from all postoperative weight/activity restrictions and can now tub soak/swim. She may slowly begin to liberalize activity as tolerated.     2. Rectal prolapse  - Planning to see CRS for further evaluation and  management of rectal prolapse.     Return in 8 week(s) for a postoperative visit with Dr. Nichol Jackson.     Scribe Attestation  By signing my name below, I, nAa Bradford, attest that this documentation has been prepared under the direction and in the presence of Nichol Jackson MD MPH on 07/09/2024 at 8:22 PM.     I Dr. Jackson, personally performed the services described in the documentation as scribed in my presence and confirm it is both complete and accurate.  Nichol Jackson MD, MPH, FACOG

## 2024-07-11 ENCOUNTER — OFFICE VISIT (OUTPATIENT)
Dept: HEMATOLOGY/ONCOLOGY | Facility: CLINIC | Age: 74
End: 2024-07-11
Payer: MEDICARE

## 2024-07-11 ENCOUNTER — APPOINTMENT (OUTPATIENT)
Dept: HEMATOLOGY/ONCOLOGY | Facility: CLINIC | Age: 74
End: 2024-07-11
Payer: MEDICARE

## 2024-07-11 VITALS
SYSTOLIC BLOOD PRESSURE: 157 MMHG | OXYGEN SATURATION: 95 % | WEIGHT: 203.71 LBS | TEMPERATURE: 97.8 F | HEIGHT: 63 IN | DIASTOLIC BLOOD PRESSURE: 77 MMHG | BODY MASS INDEX: 36.09 KG/M2 | HEART RATE: 68 BPM | RESPIRATION RATE: 16 BRPM

## 2024-07-11 DIAGNOSIS — Z08 ENCOUNTER FOR FOLLOW-UP SURVEILLANCE OF LUNG CANCER: ICD-10-CM

## 2024-07-11 DIAGNOSIS — C7B.09: Primary | ICD-10-CM

## 2024-07-11 DIAGNOSIS — Z85.118 ENCOUNTER FOR FOLLOW-UP SURVEILLANCE OF LUNG CANCER: ICD-10-CM

## 2024-07-11 PROCEDURE — 3077F SYST BP >= 140 MM HG: CPT | Performed by: NURSE PRACTITIONER

## 2024-07-11 PROCEDURE — 1126F AMNT PAIN NOTED NONE PRSNT: CPT | Performed by: NURSE PRACTITIONER

## 2024-07-11 PROCEDURE — 1123F ACP DISCUSS/DSCN MKR DOCD: CPT | Performed by: NURSE PRACTITIONER

## 2024-07-11 PROCEDURE — 3078F DIAST BP <80 MM HG: CPT | Performed by: NURSE PRACTITIONER

## 2024-07-11 PROCEDURE — 99214 OFFICE O/P EST MOD 30 MIN: CPT | Performed by: NURSE PRACTITIONER

## 2024-07-11 ASSESSMENT — COLUMBIA-SUICIDE SEVERITY RATING SCALE - C-SSRS
1. IN THE PAST MONTH, HAVE YOU WISHED YOU WERE DEAD OR WISHED YOU COULD GO TO SLEEP AND NOT WAKE UP?: NO
6. HAVE YOU EVER DONE ANYTHING, STARTED TO DO ANYTHING, OR PREPARED TO DO ANYTHING TO END YOUR LIFE?: NO
2. HAVE YOU ACTUALLY HAD ANY THOUGHTS OF KILLING YOURSELF?: NO

## 2024-07-11 ASSESSMENT — PAIN SCALES - GENERAL: PAINLEVEL: 0-NO PAIN

## 2024-07-11 NOTE — PROGRESS NOTES
Subjective:   Patient Name: Jessi Hallman    : 1950     MRN: [id number]     Age: 74 y.o.     Gender: female  Referring Provider: ZENON    CC: Management of 1.5 cm nodule in the right upper lobe is unchanged and 1.2 cm nodule in the left lower lobe is unchanged.     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  The lung nodules appear stable in size.  No new lung lesions noted.    Presenting History: At time of initial presentation a 74 y.o. female, was referred to the Hematology/Oncology Clinic by Dr. Whittaker for an opinion concerning the patient's well differentiated neuroendocrine carcinoma right upper lobe with Ki-67 less than 5%.  A CT-guided lung biopsy performed on 2021 revealed the above diagnosis.  Bilateral lung nodules have been noted on previous CAT scans as well as PET scan.  The patient has been asymptomatic and has not required treatment to date.  The lung nodules have been quite indolent overall.     PMHx: Basal cell carcinoma, cirrhosis of liver, leukopenia, neuroendocrine carcinoma of lung, Thrombocytopenia: Iron deficiency anemia, HTN, HLD, splenomegaly, diverticulosis   PSHx; cataract (left)  FHx: No history of hematologic disorders, grandpa liver cancer, mom ovarian cancer  SHx: Former smoker, denies alcohol Use , no illicit drug use; retired      Treatment Summary:  - N/A      Interval History (2024):  Present in clinic for surveillance visit.  Feeling good today.  Breathing feels fine.  SOB climbing stairs.  Resolves with rest.  Dry cough, her norm.  Denies hemoptysis.  No fevers, chills, or CP. Appetite is good.  Denies n/v/c/d. Staying hydrated.  Continues to follow with GI 2/2 liver cirrhosis  Recent cervical prolapse repair.  End of , reports swelling to right side of her face.  Work-up bu PCP.  Neck CT scan tomorrow.  Denies discomfort at time of visit.      ROS:  Patient denies the below review of systems,  except for changes as noted in the interval history.    General:  Fatigue, anorexia, fevers, sweats, chills, heat/cold intolerance, weight changes.  HEENT:  Icterus, congestion, sore throat, rhinorrhea, taste change, voice changes.  Neurology:  Headache, dizziness, vision changes, hearing changes, other motor/sensory loss, gait instability, neuropathies.  Pulmonology:  Cough, wheezing, hemoptysis, dyspnea at rest, pleuritic chest pain.  Cardiology:  Chest pain, palpitations, orthopnea, paroxysmal nocturnal dyspnea.  Gastroenterology:  Nausea, vomiting, reflux symptoms, abdominal pain, constipation, diarrhea, melena, bright red blood per rectum.  Genitourinary:  Dysuria, polyuria, urine color change, urine smell change, hematuria.   Musculoskeletal:  Arthralgias, myalgias, joint swelling, focal weakness.  Skin:  Rash, pruritis, jaundice, petechiae, nail changes, skin nodules/growth.  Psychology:  Anxiety, depression, suicidal ideation, homicidal ideation.  Heme:  Easy bleeding or bruising.  Others:   All other systems reviewed and are negative.    Medical History:   Past Medical History:   Diagnosis Date    Albuminuria 06/28/2024    BCC (basal cell carcinoma)     Cytopenia     heme/onc    HTN (hypertension)     Hyperlipidemia     Localized osteoarthritis of left knee     Metastatic malignant carcinoid tumor to lung (Multi)     [facility]; observation yearly; stable since 2017    Osteopenia     Portal hypertension (Multi)     [facility]    Primary biliary cirrhosis (Multi)     [facility]    Splenomegaly     [facility]; heme-onc    Thyroid nodule     Shayla: benign bx's x 2       Surgical History:   Past Surgical History:   Procedure Laterality Date    CATARACT EXTRACTION Left 10/2022    COLONOSCOPY  06/2021    + polyps/nelida 5 yrs; [facility]    CT GUIDED PERCUTANEOUS BIOPSY LUNG  05/10/2021    + cancerous/carcinoid  [facility]    ESOPHAGOGASTRODUODENOSCOPY  06/2021    eso varices; portal htn gastropathy; [facility]    URETHRAL SLING N/A  05/31/2024    retropubic midurethral; andvantage fit blue    US GUIDED PERCUTANEOUS PLACEMENT  10/26/2018    US GUIDED PERCUTANEOUS PLACEMENT [facility]    VAGINAL PROLAPSE REPAIR N/A 05/31/2024    Lefort colpocleisis and posterior repair/perineorrhaphy       Family History:  Family History   Problem Relation Name Age of Onset    Ovarian cancer Mother      Hypertension Mother      Coronary artery disease Father      Hypertension Father      Hypertension Sister      No Known Problems Sister      Hypertension Brother         Allergies:  No Known Allergies    Meds (Current):    Current Outpatient Medications:     acetaminophen (Tylenol) 325 mg tablet, Take 2 tablets (650 mg) by mouth every 6 hours if needed for mild pain (1 - 3)., Disp: , Rfl:     cholecalciferol (Vitamin D3) 25 MCG (1000 UT) capsule, 1 capsule (25 mcg) once every 24 hours., Disp: , Rfl:     potassium chloride CR 10 mEq ER tablet, Take 1 tablet (10 mEq) by mouth 2 times a day., Disp: 180 tablet, Rfl: 1    triamterene-hydrochlorothiazid (Maxzide) 75-50 mg tablet, Take 1 tablet by mouth once daily in the morning. Take before meals., Disp: 90 tablet, Rfl: 1    ursodiol (Actigall) 500 mg tablet, Take 1 tablet (500 mg) by mouth 2 times a day., Disp: , Rfl:     vit A/vit C/vit E/zinc/copper (ICAPS AREDS ORAL), once every 24 hours., Disp: , Rfl:   No current facility-administered medications for this visit.      Family History: No Family History items are recorded in the problem list.     Social History:   Social Substance History:  · Smoking Status former smoker (1)   · Alcohol Use denies   · Drug Use denies   · Additional History    Social history:  No history of blood transfusions or hepatitis. Employment-  retired .  Family History:  No history of hematologic disorders  noted. Grandpa- liver cancer and mom- ? ovarian cancer.  Former smoker, quit in 1990, in her 50s. Has never drank much ETOH. No RDA.   FMH, mother had uterine cancer, GF  "cancer. 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   Previously worked at a [employer], desk job in scheduling. No exposure to CT/RT/Toxins.    (1)  Performance Status (ECOG):  1     ECOG Definition  0 Fully active; no performance restrictions.  1 Strenuous physical activity restricted; fully ambulatory and able to carry out light work.  2 Capable of all self-care but unable to carry out any work activities. Up and about >50% of waking hours.  3 Capable of only limited self-care; confined to bed or chair >50% of waking hours.  4 Completely disabled; cannot carry out any self-care; totally confined to bed or chair.    Exam:  /77 (BP Location: Left arm, Patient Position: Sitting, BP Cuff Size: Adult long)   Pulse 68   Temp 36.6 °C (97.8 °F) (Temporal)   Resp 16   Ht (S) 1.594 m (5' 2.76\")   Wt 92.4 kg (203 lb 11.3 oz)   SpO2 95%   BMI 36.37 kg/m²     Wt Readings from Last 5 Encounters:   07/11/24 92.4 kg (203 lb 11.3 oz)   07/09/24 93 kg (205 lb)   07/01/24 94.2 kg (207 lb 9.6 oz)   06/27/24 94.7 kg (208 lb 12.8 oz)   06/11/24 93 kg (205 lb)      General: Well appearing, no acute distress  Neurology: Alert and oriented x3, CN II-XII grossly intact  Psychology: Affect appropriate  Eyes: PERRL, EOMI  ENT: Mucus membranes moist and intact, no ulcers  Lymphatics: No palpable adenopathy  Neck: Supple, no masses  Respiratory: Lungs clear bilaterally, no wheezes, no rales, no rhonchi  Cardiovascular: Normal rate and rhythm, no murmur  Abdomen: Soft, non-tender, non-distended, normoactive, no hepatosplenomegaly, no ascites  Musculoskeletal: Normal gait and stance  Extremities: No clubbing, no cyanosis, no edema  Skin: No rash    Labs:  Lab on 07/10/2024   Component Date Value Ref Range Status    Creatinine 07/10/2024 0.73  0.50 - 1.05 mg/dL Final    eGFR 07/10/2024 85  >60 mL/min/1.73m*2 Final    Calculations of estimated GFR are performed using the 2021 CKD-EPI Study Refit equation without " the race variable for the IDMS-Traceable creatinine methods.  https://jasn.asnjournals.org/content/early/2021/09/22/ASN.9498348355        Assessment/Plan:   74 y.o. female with PMHX of HTN, autoimmune hepatitis, HSM, and ITP, as well as well differentiated NET of the RUL who presents for a follow up. She had biopsy proven well differentiated NET 04/2021. Serial scan showed no increase in size of the RUL mass. Cu 64 Dotatate scan did only revealed localized disease without any distant mets. WIll discuss her case at  regarding management next Tuesday,.     The patient's records and imaging have been reviewed in detail.  MD discussed with the patient the natural history of their disease at length.  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  The lung nodules appear stable in size but there is no direct comparison.  No new lung lesions noted. Cu 68  Dotatate scan did not show any distant mets.      - Interval Imaging:  CT Chest (-) 06/13/2024:    1. Numerous bilateral pulmonary nodules measuring up to 1.5 cm are stable from 08/04/2023. No definite new pulmonary nodules.  2. Severe coronary artery calcifications.  3. Cirrhotic morphology of the liver with sequela of portal  hypertension including splenomegaly and prominent venous collaterals.  4. Additional stable chronic and incidental findings as above.    # Clinical Trials:  None currently.     # Access: Peripheral veins.    # Thrombocytopenia/Spenomegaly:  chronic, no gross s/sx's bleeding.  Following with Heme. Likely in the setting of ITP    # Pain: No acute pain.    # Bone Health: No signs of bony disease.     # Psychosocial: Coping well, no acute issues.  Good support from family & friends.  Social work support available.    # GI/CINV: No acute issues.  Eating and voiding well.  Nutrition consult available.    # Smoking: N/A, never  smoker.    # Fertility: N/A.      # Heme/ESAs: Chronic thrombocytopenia. Stable. Following heme.    # GOC: Patient is aware of observation status.      # Language: English.    # Interdisciplinary Patient/Family Education Record:  Learner:  Patient.  Learning Needs Reviewed:  Treatments, Medications, Disease Process, Diagnostic Tests.  Barriers: None.  Teaching Method: Discussion, Handout(s).  Comprehension:  Verbalized Understanding.  Follow-up Plan:  Return to office as per MD.    # Dispo: RTC in 6 months with visit and scan review.

## 2024-07-12 ENCOUNTER — HOSPITAL ENCOUNTER (OUTPATIENT)
Dept: RADIOLOGY | Facility: HOSPITAL | Age: 74
Discharge: HOME | End: 2024-07-12
Payer: MEDICARE

## 2024-07-12 ENCOUNTER — TELEPHONE (OUTPATIENT)
Dept: PRIMARY CARE | Facility: CLINIC | Age: 74
End: 2024-07-12

## 2024-07-12 DIAGNOSIS — D49.0 TUMOR OF PAROTID GLAND: Primary | ICD-10-CM

## 2024-07-12 PROBLEM — Z08 ENCOUNTER FOR FOLLOW-UP SURVEILLANCE OF LUNG CANCER: Status: ACTIVE | Noted: 2024-07-12

## 2024-07-12 PROBLEM — Z85.118 ENCOUNTER FOR FOLLOW-UP SURVEILLANCE OF LUNG CANCER: Status: ACTIVE | Noted: 2024-07-12

## 2024-07-12 PROCEDURE — 70491 CT SOFT TISSUE NECK W/DYE: CPT

## 2024-07-12 PROCEDURE — 2550000001 HC RX 255 CONTRASTS: Performed by: FAMILY MEDICINE

## 2024-07-12 NOTE — RESULT ENCOUNTER NOTE
There is a mass in right parotid salivary gland.  She needs to see ENT for biopsy/removal.  We can refer her to [facility] if she wants

## 2024-07-26 ENCOUNTER — DOCUMENTATION (OUTPATIENT)
Dept: PRIMARY CARE | Facility: CLINIC | Age: 74
End: 2024-07-26
Payer: MEDICARE

## 2024-08-06 ENCOUNTER — PATIENT OUTREACH (OUTPATIENT)
Dept: PRIMARY CARE | Facility: CLINIC | Age: 74
End: 2024-08-06
Payer: MEDICARE

## 2024-08-06 DIAGNOSIS — J44.9 CHRONIC OBSTRUCTIVE PULMONARY DISEASE, UNSPECIFIED COPD TYPE (MULTI): ICD-10-CM

## 2024-08-06 DIAGNOSIS — I10 PRIMARY HYPERTENSION: ICD-10-CM

## 2024-08-06 DIAGNOSIS — K11.1 ENLARGED PAROTID GLAND: ICD-10-CM

## 2024-08-06 NOTE — PROGRESS NOTES
Chart reviewed prior to call. Call placed to [facility] to open for CCM. History of HTN, lung CA, COPD, neuroendocrine tumors. Stated there hasn't been any change to the tumors in the lung since they were found in 2018. Concern at this time is the parotid gland. Appointment set on 8/13 with ENT Dr. Mcguire. Stated that she has general arthritic joint pain but it is manageable. No other concerns today. Education provided on case management, agreeable to monthly calls. Contact information provided.

## 2024-08-13 ENCOUNTER — APPOINTMENT (OUTPATIENT)
Dept: OTOLARYNGOLOGY | Facility: CLINIC | Age: 74
End: 2024-08-13
Payer: MEDICARE

## 2024-08-13 VITALS — WEIGHT: 205 LBS | HEIGHT: 63 IN | TEMPERATURE: 96.8 F | BODY MASS INDEX: 36.32 KG/M2

## 2024-08-13 DIAGNOSIS — K11.8 PAROTID MASS: ICD-10-CM

## 2024-08-13 DIAGNOSIS — R22.1 NECK MASS: Primary | ICD-10-CM

## 2024-08-13 PROCEDURE — 31575 DIAGNOSTIC LARYNGOSCOPY: CPT | Performed by: OTOLARYNGOLOGY

## 2024-08-13 PROCEDURE — 1160F RVW MEDS BY RX/DR IN RCRD: CPT | Performed by: OTOLARYNGOLOGY

## 2024-08-13 PROCEDURE — 1123F ACP DISCUSS/DSCN MKR DOCD: CPT | Performed by: OTOLARYNGOLOGY

## 2024-08-13 PROCEDURE — 1036F TOBACCO NON-USER: CPT | Performed by: OTOLARYNGOLOGY

## 2024-08-13 PROCEDURE — 1159F MED LIST DOCD IN RCRD: CPT | Performed by: OTOLARYNGOLOGY

## 2024-08-13 PROCEDURE — 99204 OFFICE O/P NEW MOD 45 MIN: CPT | Performed by: OTOLARYNGOLOGY

## 2024-08-13 NOTE — PROGRESS NOTES
"HPI  Jessi Hallman is a 74 y.o. female history of stable thyroid nodules recently with right parotitis treated with antibiotics.  Ultrasound was done which showed a discrete mass lesion and CT scan was done after which supported this.  She is here kindly referred for further evaluation.  She does not feel any masses and the swelling has regressed.  She does not have any pain today.  I have reviewed the CT scan personally and there is some heterogeneity within the gland but it is not 100% definitively discrete lesion      Past Medical History:   Diagnosis Date    Albuminuria 06/28/2024    BCC (basal cell carcinoma)     Cytopenia     heme/onc    HTN (hypertension)     Hyperlipidemia     Localized osteoarthritis of left knee     Metastatic malignant carcinoid tumor to lung (Multi)     [facility]; observation yearly; stable since 2017    Osteopenia     Portal hypertension (Multi)     [facility]    Primary biliary cirrhosis (Multi)     [facility]    Splenomegaly     [facility]; heme-onc    Thyroid nodule     [facility]: benign bx's x 2            Medications:     Current Outpatient Medications:     cholecalciferol (Vitamin D3) 25 MCG (1000 UT) capsule, 1 capsule (25 mcg) once every 24 hours., Disp: , Rfl:     potassium chloride CR 10 mEq ER tablet, Take 1 tablet (10 mEq) by mouth 2 times a day., Disp: 180 tablet, Rfl: 1    triamterene-hydrochlorothiazid (Maxzide) 75-50 mg tablet, Take 1 tablet by mouth once daily in the morning. Take before meals., Disp: 90 tablet, Rfl: 1    ursodiol (Actigall) 500 mg tablet, Take 1 tablet (500 mg) by mouth 2 times a day., Disp: , Rfl:     vit A/vit C/vit E/zinc/copper (ICAPS AREDS ORAL), once every 24 hours., Disp: , Rfl:     acetaminophen (Tylenol) 325 mg tablet, Take 2 tablets (650 mg) by mouth every 6 hours if needed for mild pain (1 - 3)., Disp: , Rfl:      Allergies:  No Known Allergies     Physical Exam:  Last Recorded Vitals  Temperature 36 °C (96.8 °F), height 1.593 m (5' 2.7\"), weight 93 kg (205 " lb).  General:     General appearance: Well-developed, well-nourished in no acute distress.       Voice:  normal       Head/face: Normal appearance; nontender to palpation     Facial nerve function: Normal and symmetric bilaterally.    Oral/oropharynx:     Oral vestibule: Normal labial and gingival mucosa     Tongue/floor of mouth: Normal without lesion     Oropharynx: Clear.  No lesions present of the hard/soft palate, posterior pharynx    Neck:     Neck: Normal appearance, trachea midline     Salivary glands: Normal to palpation bilaterally.  No masses appreciated.  Normal saliva     Lymph nodes: No cervical lymphadenopathy to palpation     Thyroid: No thyromegaly.  No palpable nodules     Range of motion: Normal    Neurological:     Cortical functions: Alert and oriented x3, appropriate affect       Larynx/hypopharynx:     Laryngeal findings: Mirror exam inadequate or limited secondary to enlarged base of tongue and/or excessive gagging    Ear:     Ear canal: Normal bilaterally     Tympanic membrane: Intact and mobile bilaterally     Pinna: Normal bilaterally     Hearing:  Gross hearing assessment normal by voice    Nose:     Visualized using: Anterior rhinoscopy     Nasopharynx: Inadequate mirror exam secondary to gag, anatomy.       Nasal dorsum: Nontraumatic midline appearance     Septum: Midline     Inferior turbinates: Normally sized     Mucosa: Bilateral, pink, normal appearing       ASSESSMENT/PLAN:  Right parotid mass by CT scan.  I question whether or not this could have been an artifact of the inflammation surrounding her recent parotitis.  There were some stones in that gland.  I have recommended an MRI at this point given that I do not appreciate a definitive mass and if the MRI supports the presence of a mass lesion we may have further discussion about plan but if the gland has normalized, no further intervention will be required        Manny Mcguire MD

## 2024-08-15 ENCOUNTER — PRE-ADMISSION TESTING (OUTPATIENT)
Dept: PREADMISSION TESTING | Facility: HOSPITAL | Age: 74
End: 2024-08-15
Payer: MEDICARE

## 2024-08-15 ENCOUNTER — APPOINTMENT (OUTPATIENT)
Dept: PREADMISSION TESTING | Facility: HOSPITAL | Age: 74
End: 2024-08-15
Payer: MEDICARE

## 2024-08-15 ENCOUNTER — LAB (OUTPATIENT)
Dept: LAB | Facility: LAB | Age: 74
End: 2024-08-15
Payer: MEDICARE

## 2024-08-15 VITALS
BODY MASS INDEX: 33.99 KG/M2 | DIASTOLIC BLOOD PRESSURE: 63 MMHG | WEIGHT: 204 LBS | OXYGEN SATURATION: 97 % | SYSTOLIC BLOOD PRESSURE: 128 MMHG | HEIGHT: 65 IN | HEART RATE: 85 BPM | RESPIRATION RATE: 16 BRPM | TEMPERATURE: 98.1 F

## 2024-08-15 DIAGNOSIS — Z01.818 PRE-OP TESTING: ICD-10-CM

## 2024-08-15 DIAGNOSIS — Z01.818 PRE-OP TESTING: Primary | ICD-10-CM

## 2024-08-15 LAB
ANION GAP SERPL CALC-SCNC: 14 MMOL/L (ref 10–20)
BUN SERPL-MCNC: 20 MG/DL (ref 6–23)
CALCIUM SERPL-MCNC: 9.4 MG/DL (ref 8.6–10.3)
CHLORIDE SERPL-SCNC: 108 MMOL/L (ref 98–107)
CO2 SERPL-SCNC: 23 MMOL/L (ref 21–32)
CREAT SERPL-MCNC: 0.85 MG/DL (ref 0.5–1.05)
EGFRCR SERPLBLD CKD-EPI 2021: 71 ML/MIN/1.73M*2
GLUCOSE SERPL-MCNC: 117 MG/DL (ref 74–99)
POTASSIUM SERPL-SCNC: 4 MMOL/L (ref 3.5–5.3)
SODIUM SERPL-SCNC: 141 MMOL/L (ref 136–145)

## 2024-08-15 PROCEDURE — 80048 BASIC METABOLIC PNL TOTAL CA: CPT

## 2024-08-15 PROCEDURE — 99214 OFFICE O/P EST MOD 30 MIN: CPT | Performed by: PHYSICIAN ASSISTANT

## 2024-08-15 PROCEDURE — 36415 COLL VENOUS BLD VENIPUNCTURE: CPT

## 2024-08-15 ASSESSMENT — DUKE ACTIVITY SCORE INDEX (DASI)
CAN YOU DO LIGHT WORK AROUND THE HOUSE LIKE DUSTING OR WASHING DISHES: YES
CAN YOU PARTICIPATE IN MODERATE RECREATIONAL ACTIVITIES LIKE GOLF, BOWLING, DANCING, DOUBLES TENNIS OR THROWING A BASEBALL OR FOOTBALL: NO
TOTAL_SCORE: 31.45
CAN YOU TAKE CARE OF YOURSELF (EAT, DRESS, BATHE, OR USE TOILET): YES
CAN YOU WALK INDOORS, SUCH AS AROUND YOUR HOUSE: YES
CAN YOU CLIMB A FLIGHT OF STAIRS OR WALK UP A HILL: YES
CAN YOU PARTICIPATE IN STRENOUS SPORTS LIKE SWIMMING, SINGLES TENNIS, FOOTBALL, BASKETBALL, OR SKIING: NO
CAN YOU RUN A SHORT DISTANCE: NO
CAN YOU DO HEAVY WORK AROUND THE HOUSE LIKE SCRUBBING FLOORS OR LIFTING AND MOVING HEAVY FURNITURE: YES
CAN YOU HAVE SEXUAL RELATIONS: NO
CAN YOU DO MODERATE WORK AROUND THE HOUSE LIKE VACUUMING, SWEEPING FLOORS OR CARRYING GROCERIES: YES
CAN YOU DO YARD WORK LIKE RAKING LEAVES, WEEDING OR PUSHING A MOWER: YES
CAN YOU WALK A BLOCK OR TWO ON LEVEL GROUND: YES
DASI METS SCORE: 6.6

## 2024-08-15 ASSESSMENT — ENCOUNTER SYMPTOMS
RESPIRATORY NEGATIVE: 1
CONSTITUTIONAL NEGATIVE: 1
CARDIOVASCULAR NEGATIVE: 1
PSYCHIATRIC NEGATIVE: 1
GASTROINTESTINAL NEGATIVE: 1
ARTHRALGIAS: 1
EYES NEGATIVE: 1
ENDOCRINE NEGATIVE: 1
NEUROLOGICAL NEGATIVE: 1

## 2024-08-15 NOTE — CPM/PAT H&P
"CPM/PAT Evaluation       Name: Jessi Hallman (Jessi Hallman)  /Age: 1950/74 y.o.     In-Person       Chief Complaint: \"I'm getting a check up on my esophagus\"    HPI  The patient is a 74 year old female with a history of portal hypertension, esophageal varices and primary biliary cirrhosis.  She has an esophagogastroduodenoscopy every year to assess for esophageal varices.  She denies dysphagia, hemoptysis, mid-epigastric pain or pressure or hematemesis.  She is followed by Dr. Ocampo and is scheduled to have an EGD on 2024.    Past Medical History:   Diagnosis Date    Albuminuria 2024    BCC (basal cell carcinoma)     Cytopenia     heme/onc    Esophageal varices (Multi)     HTN (hypertension)     Hyperlipidemia     Localized osteoarthritis of left knee     Metastatic malignant carcinoid tumor to lung (Multi)     [facility]; observation yearly; stable since 2017    Osteopenia     Portal hypertension (Multi)     [facility]    Primary biliary cirrhosis (Multi)     [facility]    Splenomegaly     [facility]; heme-onc    Thyroid nodule     [facility]ayla: benign bx's x 2       Past Surgical History:   Procedure Laterality Date    CATARACT EXTRACTION Left 10/2022    COLONOSCOPY  2021    + polyps/nelida 5 yrs; [facility]    CT GUIDED PERCUTANEOUS BIOPSY LUNG  05/10/2021    + cancerous/carcinoid  [facility]    ESOPHAGOGASTRODUODENOSCOPY  2021    eso varices; portal htn gastropathy; [facility]    URETHRAL SLING N/A 2024    retropubic midurethral; andvantage fit blue    US GUIDED PERCUTANEOUS PLACEMENT  10/26/2018    US GUIDED PERCUTANEOUS PLACEMENT [facility]    VAGINAL PROLAPSE REPAIR N/A 2024    Lefort colpocleisis and posterior repair/perineorrhaphy     Family History   Problem Relation Name Age of Onset    Ovarian cancer Mother      Hypertension Mother      Coronary artery disease Father      Hypertension Father      Hypertension Sister      No Known Problems Sister      Hypertension Brother       Social " "History     Tobacco Use    Smoking status: Former     Average packs/day: 0.5 packs/day for 40.0 years (20.0 ttl pk-yrs)     Types: Cigarettes     Start date: 1973     Passive exposure: Past    Smokeless tobacco: Never   Substance Use Topics    Alcohol use: Never     Social History     Substance and Sexual Activity   Drug Use Never     No Known Allergies    Current Outpatient Medications   Medication Sig Dispense Refill    acetaminophen (Tylenol) 325 mg tablet Take 2 tablets (650 mg) by mouth every 6 hours if needed for mild pain (1 - 3).      cholecalciferol (Vitamin D3) 25 MCG (1000 UT) capsule 1 capsule (25 mcg) once every 24 hours.      potassium chloride CR 10 mEq ER tablet Take 1 tablet (10 mEq) by mouth 2 times a day. 180 tablet 1    triamterene-hydrochlorothiazid (Maxzide) 75-50 mg tablet Take 1 tablet by mouth once daily in the morning. Take before meals. 90 tablet 1    ursodiol (Actigall) 500 mg tablet Take 1 tablet (500 mg) by mouth 2 times a day.      vit A/vit C/vit E/zinc/copper (ICAPS AREDS ORAL) Take 1 tablet by mouth 2 times a day.       No current facility-administered medications for this visit.     Review of Systems   Constitutional: Negative.    HENT: Negative.     Eyes: Negative.    Respiratory: Negative.     Cardiovascular: Negative.    Gastrointestinal: Negative.    Endocrine: Negative.    Genitourinary: Negative.    Musculoskeletal:  Positive for arthralgias.   Neurological: Negative.    Psychiatric/Behavioral: Negative.       /63   Pulse 85   Temp 36.7 °C (98.1 °F) (Temporal)   Resp 16   Ht 1.651 m (5' 5\")   Wt 92.5 kg (204 lb)   SpO2 97%   BMI 33.95 kg/m²     Physical Exam  Vitals reviewed.   Constitutional:       Appearance: Normal appearance.   HENT:      Head: Normocephalic and atraumatic.      Mouth/Throat:      Mouth: Mucous membranes are moist.      Pharynx: Oropharynx is clear.   Eyes:      Extraocular Movements: Extraocular movements intact.      Pupils: Pupils are " equal, round, and reactive to light.      Comments: Glasses     Cardiovascular:      Rate and Rhythm: Normal rate and regular rhythm.      Heart sounds: Murmur (systolic murmur grade II/VI noted at the right upper sternal border and radiating to the left sternal border.) heard.   Pulmonary:      Effort: Pulmonary effort is normal.      Breath sounds: Normal breath sounds.   Abdominal:      General: Bowel sounds are normal.      Palpations: Abdomen is soft.   Musculoskeletal:         General: No swelling.   Skin:     General: Skin is warm and dry.   Neurological:      General: No focal deficit present.      Mental Status: She is alert and oriented to person, place, and time.   Psychiatric:         Mood and Affect: Mood normal.         Behavior: Behavior normal.        PAT AIRWAY:   Airway:     Mallampati::  II    TM distance::  >3 FB    Neck ROM::  Full   Fair dentition    ASA:  III  DASI SCORE:  31.45  METS SCORE:  6.6  CHAD2 SCORE:  4.0%  REVISED CARDIAC RISK INDEX:  0.4%  STOP BANG SCORE:  3    EKG  5/2/2024  BMP ordered during PAT visit    Assessment and Plan:     Portal hypertension, esophageal varices, primary biliary cirrhosis:  Esophagogastroduodenoscopy  H/O Carcinoid tumor of lung - diagnosed 2017 - no surgery/chemotherapy required - currently stable, monitored by Dr. Kusum Espinoza - Oncology  Hypertension - taking Jane Waterman PA-C

## 2024-08-15 NOTE — PREPROCEDURE INSTRUCTIONS
Why must I stop eating and drinking near surgery time?  With sedation, food or liquid in your stomach can enter your lungs causing serious complications  Increases nausea and vomiting    When do I need to stop eating and drinking before my surgery?   Do not eat or drink after midnight the night before your surgery/procedure.  You may have small sips of water to take your medication.    PAT DISCHARGE INSTRUCTIONS    Please call the Same Day Surgery (SDS) Department of the hospital where your procedure will be performed after 2:00 PM the day before your surgery. If you are scheduled on a Monday, or a Tuesday following a Monday holiday, you will need to call on the last business day prior to your surgery.      [place] [id number] [place] [id number] [phone]  Second Floor      Please let your surgeon know if:      You develop any open sores, shingles, burning or painful urination as these may increase your risk of an infection.   You no longer wish to have the surgery.   Any other personal circumstances change that may lead to the need to cancel or defer this surgery-such as being sick or getting admitted to any hospital within one week of your planned procedure.    Your contact details change, such as a change of address or phone number.    Starting now:     Please DO NOT drink alcohol or smoke for 24 hours before surgery. It is well known that quitting smoking can make a huge difference to your health and recovery from surgery. The longer you abstain from smoking, the better your chances of a healthy recovery. If you need help with quitting, call 5-800-QUIT-NOW to be connected to a trained counselor who will discuss the best methods to help you quit.     Before your surgery:    Please stop all supplements 7 days prior to surgery. Or as directed by your surgeon.   Please stop taking NSAID pain medicine such as Advil and Motrin 7 days before surgery.    If you  develop any fever, cough, cold, rashes, cuts, scratches, scrapes, urinary symptoms or infection anywhere on your body (including teeth and gums) prior to surgery, please call your surgeon’s office as soon as possible. This may require treatment to reduce the chance of cancellation on the day of surgery.    The day before your surgery:   DIET- Please follow the diet instructions at the top of your packet.   Get a good night’s rest.  Use the special soap for bathing if you have been instructed to use one.    Scheduled surgery times may change and you will be notified if this occurs - please check your personal voicemail for any updates.     On the morning of surgery:   Wear comfortable, loose fitting clothes which open in the front. Please do not wear moisturizers, creams, lotions, makeup or perfume.    Please bring with you to surgery:   Photo ID and insurance card   Current list of medicines and allergies   Pacemaker/ Defibrillator/Heart stent cards   CPAP machine and mask    Slings/ splints/ crutches   A copy of your complete advanced directive/DHPOA.    Please do NOT bring with you to surgery:   All jewelry and valuables should be left at home.   Prosthetic devices such as contact lenses, hearing aids, dentures, eyelash extensions, hairpins and body piercings must be removed prior to going in to the surgical suite.    After outpatient surgery:   A responsible adult MUST accompany you at the time of discharge and stay with you for 24 hours after your surgery. You may NOT drive yourself home after surgery.    Do not drive, operate machinery, make critical decisions or do activities that require co-ordination or balance until after a night’s sleep.   Do not drink alcoholic beverages for 24 hours.   Instructions for resuming your medications will be provided by your surgeon.    CALL YOUR DOCTOR AFTER SURGERY IF YOU HAVE:     Chills and/or a fever of 101° F or higher.    Redness, swelling, pus or drainage from your  surgical wound or a bad smell from the wound.    Lightheadedness, fainting or confusion.    Persistent vomiting (throwing up) and are not able to eat or drink for 12 hours.    Three or more loose, watery bowel movements in 24 hours (diarrhea).   Difficulty or pain while urinating( after non-urological surgery)    Pain and swelling in your legs, especially if it is only on one side.    Difficulty breathing or are breathing faster than normal.    Any new concerning symptoms.         Medication List            Accurate as of August 15, 2024  1:10 PM. Always use your most recent med list.                acetaminophen 325 mg tablet  Commonly known as: Tylenol  Notes to patient: Take as needed.     ICAPS AREDS ORAL  Medication Adjustments for Surgery: Stop 7 days before surgery     potassium chloride CR 10 mEq ER tablet  Commonly known as: Klor-Con  Take 1 tablet (10 mEq) by mouth 2 times a day.  Medication Adjustments for Surgery: Take morning of surgery with sip of water, no other fluids     triamterene-hydrochlorothiazid 75-50 mg tablet  Commonly known as: Maxzide  Take 1 tablet by mouth once daily in the morning. Take before meals.  Medication Adjustments for Surgery: Take morning of surgery with sip of water, no other fluids     ursodiol 500 mg tablet  Commonly known as: Actigall  Notes to patient: Hold dose day of surgery.     Vitamin D3 25 MCG (1000 UT) capsule  Generic drug: cholecalciferol  Medication Adjustments for Surgery: Stop 7 days before surgery

## 2024-08-20 ENCOUNTER — TELEPHONE (OUTPATIENT)
Dept: OTOLARYNGOLOGY | Facility: CLINIC | Age: 74
End: 2024-08-20
Payer: MEDICARE

## 2024-08-20 DIAGNOSIS — R22.1 NECK MASS: Primary | ICD-10-CM

## 2024-08-20 NOTE — TELEPHONE ENCOUNTER
LOV 8/13/2024 for possible right parotid mass on CT and MRI was recommended. Please place order for MRI so she can schedule.

## 2024-08-22 ENCOUNTER — HOSPITAL ENCOUNTER (OUTPATIENT)
Dept: GASTROENTEROLOGY | Facility: HOSPITAL | Age: 74
Discharge: HOME | End: 2024-08-22
Payer: MEDICARE

## 2024-08-22 VITALS
BODY MASS INDEX: 33.99 KG/M2 | OXYGEN SATURATION: 99 % | HEART RATE: 66 BPM | TEMPERATURE: 97.3 F | WEIGHT: 204 LBS | SYSTOLIC BLOOD PRESSURE: 159 MMHG | DIASTOLIC BLOOD PRESSURE: 65 MMHG | HEIGHT: 65 IN | RESPIRATION RATE: 16 BRPM

## 2024-08-22 DIAGNOSIS — K74.3 PRIMARY BILIARY CIRRHOSIS (MULTI): ICD-10-CM

## 2024-08-22 DIAGNOSIS — I85.00 ESOPHAGEAL VARICES WITHOUT BLEEDING, UNSPECIFIED ESOPHAGEAL VARICES TYPE (MULTI): ICD-10-CM

## 2024-08-22 DIAGNOSIS — D50.9 IRON DEFICIENCY ANEMIA, UNSPECIFIED IRON DEFICIENCY ANEMIA TYPE: ICD-10-CM

## 2024-08-22 DIAGNOSIS — K62.3 RECTAL PROLAPSE: ICD-10-CM

## 2024-08-22 DIAGNOSIS — C7B.09: ICD-10-CM

## 2024-08-22 PROCEDURE — 43235 EGD DIAGNOSTIC BRUSH WASH: CPT | Performed by: INTERNAL MEDICINE

## 2024-08-22 PROCEDURE — 7100000009 HC PHASE TWO TIME - INITIAL BASE CHARGE

## 2024-08-22 PROCEDURE — 3700000012 HC SEDATION LEVEL 5+ TIME - INITIAL 15 MINUTES 5/> YEARS

## 2024-08-22 PROCEDURE — 2500000004 HC RX 250 GENERAL PHARMACY W/ HCPCS (ALT 636 FOR OP/ED): Performed by: INTERNAL MEDICINE

## 2024-08-22 PROCEDURE — 7100000010 HC PHASE TWO TIME - EACH INCREMENTAL 1 MINUTE

## 2024-08-22 RX ORDER — MIDAZOLAM HYDROCHLORIDE 1 MG/ML
INJECTION, SOLUTION INTRAMUSCULAR; INTRAVENOUS AS NEEDED
Status: COMPLETED | OUTPATIENT
Start: 2024-08-22 | End: 2024-08-22

## 2024-08-22 RX ORDER — FENTANYL CITRATE 50 UG/ML
INJECTION, SOLUTION INTRAMUSCULAR; INTRAVENOUS AS NEEDED
Status: COMPLETED | OUTPATIENT
Start: 2024-08-22 | End: 2024-08-22

## 2024-08-22 ASSESSMENT — PAIN SCALES - GENERAL
PAINLEVEL_OUTOF10: 0 - NO PAIN

## 2024-08-22 ASSESSMENT — COLUMBIA-SUICIDE SEVERITY RATING SCALE - C-SSRS
2. HAVE YOU ACTUALLY HAD ANY THOUGHTS OF KILLING YOURSELF?: NO
1. IN THE PAST MONTH, HAVE YOU WISHED YOU WERE DEAD OR WISHED YOU COULD GO TO SLEEP AND NOT WAKE UP?: NO
6. HAVE YOU EVER DONE ANYTHING, STARTED TO DO ANYTHING, OR PREPARED TO DO ANYTHING TO END YOUR LIFE?: NO

## 2024-08-22 ASSESSMENT — PAIN - FUNCTIONAL ASSESSMENT
PAIN_FUNCTIONAL_ASSESSMENT: 0-10

## 2024-08-22 NOTE — PERIOPERATIVE NURSING NOTE
1347- Arrived to [facility] 13 via cart with Endo team. Alert, VSS. IV saline locked. Repositioned and set up with drink and snack, call light within reach. Family at bedside. Dr Ocampo into see pt and discuss procedure findings, questions answered.      1405- VSS. Tolerating PO. IV discontinued. Reviewed discharge instructions with pt and family, questions answered. Discharged home

## 2024-09-05 ENCOUNTER — PATIENT OUTREACH (OUTPATIENT)
Dept: PRIMARY CARE | Facility: CLINIC | Age: 74
End: 2024-09-05
Payer: MEDICARE

## 2024-09-05 DIAGNOSIS — J44.9 CHRONIC OBSTRUCTIVE PULMONARY DISEASE, UNSPECIFIED COPD TYPE (MULTI): ICD-10-CM

## 2024-09-05 DIAGNOSIS — I10 PRIMARY HYPERTENSION: ICD-10-CM

## 2024-09-05 NOTE — PROGRESS NOTES
Angelina stated she is doing well. She had an EGD on 8/22 that is done yearly. It showed some esophageal varices and hiatal hernia. She has an MRI for her neck tomorrow so the ENT physician can look at the area she has had swelling. Denies any new pain. Has the chronic arthritic pain.

## 2024-09-06 ENCOUNTER — HOSPITAL ENCOUNTER (OUTPATIENT)
Dept: RADIOLOGY | Facility: CLINIC | Age: 74
Discharge: HOME | End: 2024-09-06
Payer: MEDICARE

## 2024-09-06 DIAGNOSIS — R22.1 NECK MASS: ICD-10-CM

## 2024-09-06 PROCEDURE — 2550000001 HC RX 255 CONTRASTS: Performed by: OTOLARYNGOLOGY

## 2024-09-06 PROCEDURE — 70543 MRI ORBT/FAC/NCK W/O &W/DYE: CPT

## 2024-09-06 PROCEDURE — A9575 INJ GADOTERATE MEGLUMI 0.1ML: HCPCS | Performed by: OTOLARYNGOLOGY

## 2024-09-06 RX ORDER — GADOTERATE MEGLUMINE 376.9 MG/ML
18 INJECTION INTRAVENOUS
Status: COMPLETED | OUTPATIENT
Start: 2024-09-06 | End: 2024-09-06

## 2024-09-10 ENCOUNTER — APPOINTMENT (OUTPATIENT)
Dept: OBSTETRICS AND GYNECOLOGY | Facility: CLINIC | Age: 74
End: 2024-09-10
Payer: MEDICARE

## 2024-09-10 VITALS
BODY MASS INDEX: 34.32 KG/M2 | HEIGHT: 65 IN | SYSTOLIC BLOOD PRESSURE: 130 MMHG | WEIGHT: 206 LBS | DIASTOLIC BLOOD PRESSURE: 68 MMHG

## 2024-09-10 DIAGNOSIS — Z98.890 HISTORY OF SUBURETHRAL SLING PROCEDURE: ICD-10-CM

## 2024-09-10 DIAGNOSIS — N32.81 OAB (OVERACTIVE BLADDER): Primary | ICD-10-CM

## 2024-09-10 PROCEDURE — 3075F SYST BP GE 130 - 139MM HG: CPT | Performed by: OBSTETRICS & GYNECOLOGY

## 2024-09-10 PROCEDURE — 99213 OFFICE O/P EST LOW 20 MIN: CPT | Performed by: OBSTETRICS & GYNECOLOGY

## 2024-09-10 PROCEDURE — 1123F ACP DISCUSS/DSCN MKR DOCD: CPT | Performed by: OBSTETRICS & GYNECOLOGY

## 2024-09-10 PROCEDURE — 1159F MED LIST DOCD IN RCRD: CPT | Performed by: OBSTETRICS & GYNECOLOGY

## 2024-09-10 PROCEDURE — 3078F DIAST BP <80 MM HG: CPT | Performed by: OBSTETRICS & GYNECOLOGY

## 2024-09-10 NOTE — PROGRESS NOTES
"POST OPERATIVE VISIT  The patient is a 74 y.o. female who presents for a postoperative follow up visit.    She underwent LeFort colpocleisis, UT/perineorrhaphy, midurethral sling, and cystoscopy on 5/31/2024 and is now 4 month(s) postop.     Record Review:  - 7/9/2024 Dr. Nichol Jackson postop note Re: Doing well from a postoperative and healing standpoint. Released from all restrictions. Referred her to CRS for rectal prolapse management.   - 6/11/2024 Dr. Jackson note Re: Doing well postoperatively and healing well. No return of POP or LUMA symptoms. For urinary urgency, we will give this x1 month time to continue healing process and if not improved will start OAB medication. Rectal prolapse, she was bleeding during bowel movements and no VB noted. She discontinued using MiraLAX.   - 5/31/2024: Lefort colpocleisis, UT, MUS, cystoscopy. She has thrombocytopenia and had postop bleeding, so she was kept overnight. Patient was seen the next day and was doing well.         INTERVAL HISTORY:  Postoperative Symptoms and Recovery:   - She has overall recovered well from surgery with no residual postoperative pain.  - Her LUMA has slowly been improving over time. She is now able to cough, laugh, and sneeze without LUMA episodes. However, she is experiencing occasional UUI leakage.    - She goes through 1 pad per day due to scant UUI leakage.   - Voids every 3-4 hours during the day. She rarely leaks due to urgency and tries to limit fluids at 6pm every night to reduce nocturia.   - Nocturia 2-3x per night.   - She has not gone to colo/rectal surgery yet to assess her rectal prolapse.   - No fevers, chills, nausea, or vomiting.       PHYSICAL EXAM:  /68   Ht 1.651 m (5' 5\")   Wt 93.4 kg (206 lb)   BMI 34.28 kg/m²   Urine dip: No results found for this or any previous visit.     General Appearance: well developed, good nutrition, well groomed, no deformities, normal habitus  Head: normocephalic, and atraumatic  Neck: " symmetric, midline trachea,  full range of motion  Respiratory: no dyspnea, negative for intercostal retraction or uses of accessory muscles of respiration  Cardiovascular: peripheral pulses are normal, no swelling, edema or varicosities  Abdomen: Abdomen soft, non-tender, non-distended.       Impression/plan:  74 year old female presenting in postoperative follow up s/p LeFort colpocleisis, NJ/perineorrhaphy, midurethral sling, and cystoscopy on 5/31/2024 for uterovaginal prolapse and LUMA.      Diagnoses:  #1 Uterovaginal prolapse (resolved)   #2 Stress urinary incontinence (resolved)   #3 Urge incontinence      Plan:  1. POP, LUMA, postop  - 5/31/2024 surgery: LeFort colpocleisis, NJ/perineorrhaphy, midurethral sling, and cystoscopy.  - Preoperative POP and LUMA symptoms resolved.     2. UUI  - We discussed OAB lifestyle changes (i.e., trying to limit fluids to 60oz in total per day, timed voiding every 2-3 hours, stop drinking fluids 3 hours prior to bedtime, and avoiding/limiting bladder irritants such as caffeine, nicotine, artificial sweeteners, acidic/spicy foods, and alcohol). Reviewed using OTC Biotene products (I.e. mouthwash, lozenges, sprays) to reduce symptoms of dry mouth to avoid drinking large quantities of fluid before bed.   - Discussed OAB treatment options such as lifestyle changes, PFPT, medications, PTNS, intradetrusor Botox injections, or SNM.   - We discussed that PFPT may help with bladder/pelvic floor restrengthening exercises with an overall goal to better control the bladder and improve urinary symptoms. PFPT includes attending sessions with a specialized licensed female pelvic floor physical therapist who does external with internal vaginal work to ensure she is doing the correct exercises to obtain the most benefit of physical therapy. We reviewed the importance of continuing these home exercises to receive maximum benefit from PFPT. They also teach mind over bladder strategies/urge  suppression techniques to reduce the intensity of the urge to void.   - We discussed that with starting an OAB medication the goal would be to allow the detrusor muscle around the bladder to relax and prevent the muscles from spasm/squeezing too frequently to allow the bladder to store more urine which reduces the urge, frequency, and incontinent episodes.   - Patient declines interest in trying an OAB medication and is not severely bothered by UUI so she wishes to pursue conservative measures. However, she is amenable to trying lifestyle changes of trying Biotene products to reduce fluid intake.     Return in 8 months with Dr. Nichol Jackson for a pelvic exam to check TVT mesh.     Scribe Attestation  By signing my name below, I, Madhu Thapa, Shainae, attest that this documentation has been prepared under the direction and in the presence of Nichol Jackson MD MPH on 09/10/2024 at 5:21 PM.     I Dr. Jackson, personally performed the services described in the documentation as scribed in my presence and confirm it is both complete and accurate.  Nichol Jackson MD, MPH, FACOG

## 2024-10-07 ENCOUNTER — PATIENT OUTREACH (OUTPATIENT)
Dept: PRIMARY CARE | Facility: CLINIC | Age: 74
End: 2024-10-07
Payer: MEDICARE

## 2024-10-07 DIAGNOSIS — J44.9 CHRONIC OBSTRUCTIVE PULMONARY DISEASE, UNSPECIFIED COPD TYPE (MULTI): ICD-10-CM

## 2024-10-07 DIAGNOSIS — I10 PRIMARY HYPERTENSION: ICD-10-CM

## 2024-10-07 NOTE — PROGRESS NOTES
Chart reviewed prior to call. Angelina states that she is doing well. No changes with the chronic arthritis pain. MRI of her neck was completed. She will follow up with ENT on 11/26. No new concerns. No medication needs. Next PCP appointment is on 12/12.

## 2024-10-22 ENCOUNTER — APPOINTMENT (OUTPATIENT)
Dept: OTOLARYNGOLOGY | Facility: CLINIC | Age: 74
End: 2024-10-22
Payer: MEDICARE

## 2024-11-03 PROBLEM — K11.20 PAROTITIS: Status: RESOLVED | Noted: 2024-05-10 | Resolved: 2024-11-03

## 2024-11-03 PROBLEM — G89.18 POSTOPERATIVE PAIN: Status: RESOLVED | Noted: 2024-05-31 | Resolved: 2024-11-03

## 2024-11-03 NOTE — PROGRESS NOTES
HPI:       Hypertension:          The patient has no issues.          The patients cardiovascular risk factors include:         Cardiac Risk Factors  Age > 45-male, > 55-female:  YES  +1   Smoking:   NO   Sig. family hx of CHD*:  YES  +1   Hypertension:   YES  +1   Diabetes:   NO   HDL < 35:   NO   HDL > 59:   NO   Total: 3     *- Sig. family h/o CHD per NCEP = MI or sudden death at <56yo in   father or other 1st-degree male relative, or <64yo in mother or   other 1st-degree female relative           The patients adherence to the treatment regimen is Excellent         Responses to the medications has been Excellent    Hyperlipidemia:   The patient does not use medications that may worsen dyslipidemias (corticosteroids, progestins, anabolic steroids, diuretics, beta-blockers, amiodarone, cyclosporine, olanzapine). The patient exercises intermittently.  The patient is not known to have coexisting coronary artery disease.      MEDICAL HISTORY:   Past Medical History:   Diagnosis Date    Albuminuria 06/28/2024    BCC (basal cell carcinoma)     Cytopenia     heme/onc    Esophageal varices     HTN (hypertension)     Hyperlipidemia     Localized osteoarthritis of left knee     Metastatic malignant carcinoid tumor to lung     [facility]; observation yearly; stable since 2017    Osteopenia     Portal hypertension (Multi)     [facility]    Primary biliary cirrhosis     [facility]    Splenomegaly     [facility]; heme-onc    Thyroid nodule     Shayla: benign bx's x 2     MEDICATIONS:   Current Outpatient Medications   Medication Sig Dispense Refill    acetaminophen (Tylenol) 325 mg tablet Take 2 tablets (650 mg) by mouth every 6 hours if needed for mild pain (1 - 3).      cholecalciferol (Vitamin D3) 25 MCG (1000 UT) capsule 1 capsule (25 mcg) once every 24 hours.      ursodiol (Actigall) 500 mg tablet Take 1 tablet (500 mg) by mouth 2 times a day.      vit A/vit C/vit E/zinc/copper (ICAPS AREDS ORAL) Take 1 tablet by mouth 2 times a day.       potassium chloride CR 10 mEq ER tablet Take 1 tablet (10 mEq) by mouth 2 times a day. 180 tablet 1    triamterene-hydrochlorothiazid (Maxzide) 75-50 mg tablet Take 1 tablet by mouth once daily in the morning. Take before meals. 90 tablet 1     No current facility-administered medications for this visit.     ALLERGIES: No Known Allergies  FAMILY HISTORY:   Family History   Problem Relation Name Age of Onset    Ovarian cancer Mother      Hypertension Mother      Coronary artery disease Father      Hypertension Father      Hypertension Sister      No Known Problems Sister      Hypertension Brother       SOCIAL HISTORY:   Social History     Tobacco Use    Smoking status: Former     Average packs/day: 0.5 packs/day for 40.0 years (20.0 ttl pk-yrs)     Types: Cigarettes     Start date: 1973     Passive exposure: Past    Smokeless tobacco: Never   Vaping Use    Vaping status: Never Used   Substance Use Topics    Alcohol use: Never    Drug use: Never         ROS:      CONSTITUTION:  alert and oriented     OPHTHALMOLOGY:          no Change in vision.         CARDIOLOGY:          no Chest pain.  no Dyspnea on exertion.  no Shortness of breath.         ENDOCRINOLOGY:          no Excessive thirst.  no Excessive urination.  no Fatigue.  no Skin changes.  no Weight gain.  no Weight loss.         SKIN:          no Healing problems.         NEUROLOGY:          no Loss of sensation in specific body area.  no Burning pain in feet.  no Peripheral neuropathy.  no Tingling/numbness.    LABS: due  Lab Results   Component Value Date    GLUCOSE 117 (H) 08/15/2024    CALCIUM 9.4 08/15/2024     08/15/2024    K 4.0 08/15/2024    CO2 23 08/15/2024     (H) 08/15/2024    BUN 20 08/15/2024    CREATININE 0.85 08/15/2024      VITAL SIGNS:  /64   Pulse 81   Wt 92.4 kg (203 lb 12.8 oz)   SpO2 100%   BMI 33.91 kg/m²     General Appearance: awake, alert, oriented, in no acute distress  Lungs: Normal expansion.  Clear to  auscultation.  No rales, rhonchi, or wheezing.  Heart: Heart sounds are normal.  Regular rate and rhythm without murmur, gallop or rub.  Extremities: Extremities warm to touch, pink, with no edema.  Neurologic: Alert and oriented x 3, gait normal., reflexes normal and symmetric, strength and  sensation grossly normal    Jessi was seen today for hypertension.  Diagnoses and all orders for this visit:  Primary hypertension (Primary)  -     Albumin-Creatinine Ratio, Urine Random; Future  -     Urinalysis with Reflex Microscopic; Future  -     Follow Up In Primary Care - Established  -     triamterene-hydrochlorothiazid (Maxzide) 75-50 mg tablet; Take 1 tablet by mouth once daily in the morning. Take before meals.  -     potassium chloride CR 10 mEq ER tablet; Take 1 tablet (10 mEq) by mouth 2 times a day.  -     Follow Up In Primary Care - Established; Future  Mixed hyperlipidemia  -     Lipid Panel; Future

## 2024-11-25 ENCOUNTER — PATIENT OUTREACH (OUTPATIENT)
Dept: PRIMARY CARE | Facility: CLINIC | Age: 74
End: 2024-11-25
Payer: MEDICARE

## 2024-11-25 DIAGNOSIS — I10 PRIMARY HYPERTENSION: ICD-10-CM

## 2024-11-25 DIAGNOSIS — J44.9 CHRONIC OBSTRUCTIVE PULMONARY DISEASE, UNSPECIFIED COPD TYPE (MULTI): ICD-10-CM

## 2024-11-25 PROCEDURE — 99490 CHRNC CARE MGMT STAFF 1ST 20: CPT | Performed by: FAMILY MEDICINE

## 2024-11-25 NOTE — PROGRESS NOTES
Chart reviewed prior to call. Angelina has had a cold for the last week. Still has a cough but is improving slowly. ENT appointment tomorrow. No concerns voiced today.

## 2024-11-26 ENCOUNTER — APPOINTMENT (OUTPATIENT)
Dept: OTOLARYNGOLOGY | Facility: CLINIC | Age: 74
End: 2024-11-26
Payer: MEDICARE

## 2024-11-26 VITALS — BODY MASS INDEX: 33.49 KG/M2 | WEIGHT: 201 LBS | TEMPERATURE: 97.1 F | HEIGHT: 65 IN

## 2024-11-26 DIAGNOSIS — R22.1 NECK MASS: ICD-10-CM

## 2024-11-26 DIAGNOSIS — K11.8 PAROTID MASS: Primary | ICD-10-CM

## 2024-11-26 PROCEDURE — 1160F RVW MEDS BY RX/DR IN RCRD: CPT | Performed by: OTOLARYNGOLOGY

## 2024-11-26 PROCEDURE — 99214 OFFICE O/P EST MOD 30 MIN: CPT | Performed by: OTOLARYNGOLOGY

## 2024-11-26 PROCEDURE — 1159F MED LIST DOCD IN RCRD: CPT | Performed by: OTOLARYNGOLOGY

## 2024-11-26 PROCEDURE — 1123F ACP DISCUSS/DSCN MKR DOCD: CPT | Performed by: OTOLARYNGOLOGY

## 2024-11-27 NOTE — PROGRESS NOTES
HPI  Jessi Hallman is a 74 y.o. female follow-up MRI for evaluation of parotid mass.  MRI demonstrates small 5 mm finding in the superficial lobe.  She is no longer able to feel it.  She has no pain.    Prior hx: history of stable thyroid nodules recently with right parotitis treated with antibiotics.  Ultrasound was done which showed a discrete mass lesion and CT scan was done after which supported this.  She is here kindly referred for further evaluation.  She does not feel any masses and the swelling has regressed.  She does not have any pain today.  I have reviewed the CT scan personally and there is some heterogeneity within the gland but it is not 100% definitively discrete lesion      Past Medical History:   Diagnosis Date    Albuminuria 06/28/2024    BCC (basal cell carcinoma)     Cytopenia     heme/onc    Esophageal varices     HTN (hypertension)     Hyperlipidemia     Localized osteoarthritis of left knee     Metastatic malignant carcinoid tumor to lung      [facility]; observation yearly; stable since 2017    Osteopenia     Portal hypertension (Multi)     [facility]    Primary biliary cirrhosis     [facility]    Splenomegaly     [facility]; heme-onc    Thyroid nodule     Shayla: benign bx's x 2            Medications:     Current Outpatient Medications:     acetaminophen (Tylenol) 325 mg tablet, Take 2 tablets (650 mg) by mouth every 6 hours if needed for mild pain (1 - 3)., Disp: , Rfl:     cholecalciferol (Vitamin D3) 25 MCG (1000 UT) capsule, 1 capsule (25 mcg) once every 24 hours., Disp: , Rfl:     potassium chloride CR 10 mEq ER tablet, Take 1 tablet (10 mEq) by mouth 2 times a day., Disp: 180 tablet, Rfl: 1    triamterene-hydrochlorothiazid (Maxzide) 75-50 mg tablet, Take 1 tablet by mouth once daily in the morning. Take before meals., Disp: 90 tablet, Rfl: 1    ursodiol (Actigall) 500 mg tablet, Take 1 tablet (500 mg) by mouth 2 times a day., Disp: , Rfl:     vit A/vit C/vit E/zinc/copper (ICAPS AREDS ORAL), Take 1  "tablet by mouth 2 times a day., Disp: , Rfl:      Allergies:  No Known Allergies     Physical Exam:  Last Recorded Vitals  Temperature 36.2 °C (97.1 °F), height 1.651 m (5' 5\"), weight 91.2 kg (201 lb).  General:     General appearance: Well-developed, well-nourished in no acute distress.       Voice:  normal       Head/face: Normal appearance; nontender to palpation     Facial nerve function: Normal and symmetric bilaterally.    Oral/oropharynx:     Oral vestibule: Normal labial and gingival mucosa     Tongue/floor of mouth: Normal without lesion     Oropharynx: Clear.  No lesions present of the hard/soft palate, posterior pharynx    Neck:     Neck: Normal appearance, trachea midline     Salivary glands: Normal to palpation bilaterally.  No masses appreciated.  Normal saliva     Lymph nodes: No cervical lymphadenopathy to palpation     Thyroid: No thyromegaly.  No palpable nodules     Range of motion: Normal    Neurological:     Cortical functions: Alert and oriented x3, appropriate affect       Larynx/hypopharynx:     Laryngeal findings: Mirror exam inadequate or limited secondary to enlarged base of tongue and/or excessive gagging    Ear:     Ear canal: Normal bilaterally     Tympanic membrane: Intact and mobile bilaterally     Pinna: Normal bilaterally     Hearing:  Gross hearing assessment normal by voice    Nose:     Visualized using: Anterior rhinoscopy     Nasopharynx: Inadequate mirror exam secondary to gag, anatomy.       Nasal dorsum: Nontraumatic midline appearance     Septum: Midline     Inferior turbinates: Normally sized     Mucosa: Bilateral, pink, normal appearing       ASSESSMENT/PLAN:  This MRI finding is quite small and probably represents some inflammatory finding from recent parotitis.  She does have history of stones.  She is not having any active problems now and does not feel mass I am not recommended further workup of this at present.  She will call with any recurrence of swelling or new " symptoms in this area or new symptoms otherwise      Manny Mcguire MD

## 2024-12-12 ENCOUNTER — OFFICE VISIT (OUTPATIENT)
Dept: PRIMARY CARE | Facility: CLINIC | Age: 74
End: 2024-12-12
Payer: MEDICARE

## 2024-12-12 ENCOUNTER — LAB (OUTPATIENT)
Dept: LAB | Facility: LAB | Age: 74
End: 2024-12-12
Payer: MEDICARE

## 2024-12-12 VITALS
BODY MASS INDEX: 33.91 KG/M2 | HEART RATE: 81 BPM | DIASTOLIC BLOOD PRESSURE: 64 MMHG | SYSTOLIC BLOOD PRESSURE: 128 MMHG | OXYGEN SATURATION: 100 % | WEIGHT: 203.8 LBS

## 2024-12-12 DIAGNOSIS — K74.3 PRIMARY BILIARY CHOLANGITIS (MULTI): Primary | ICD-10-CM

## 2024-12-12 DIAGNOSIS — K74.3 HEPATIC CIRRHOSIS DUE TO PRIMARY BILIARY CHOLANGITIS (MULTI): ICD-10-CM

## 2024-12-12 DIAGNOSIS — E78.2 MIXED HYPERLIPIDEMIA: ICD-10-CM

## 2024-12-12 DIAGNOSIS — I10 PRIMARY HYPERTENSION: Primary | ICD-10-CM

## 2024-12-12 DIAGNOSIS — I10 PRIMARY HYPERTENSION: ICD-10-CM

## 2024-12-12 DIAGNOSIS — K74.60 HEPATIC CIRRHOSIS, UNSPECIFIED HEPATIC CIRRHOSIS TYPE, UNSPECIFIED WHETHER ASCITES PRESENT (MULTI): ICD-10-CM

## 2024-12-12 DIAGNOSIS — K74.3 PRIMARY BILIARY CHOLANGITIS (MULTI): ICD-10-CM

## 2024-12-12 LAB
ALBUMIN SERPL BCP-MCNC: 3 G/DL (ref 3.4–5)
ALP SERPL-CCNC: 142 U/L (ref 33–136)
ALT SERPL W P-5'-P-CCNC: 15 U/L (ref 7–45)
ANION GAP SERPL CALC-SCNC: 11 MMOL/L (ref 10–20)
APPEARANCE UR: ABNORMAL
AST SERPL W P-5'-P-CCNC: 29 U/L (ref 9–39)
BILIRUB SERPL-MCNC: 2.1 MG/DL (ref 0–1.2)
BILIRUB UR STRIP.AUTO-MCNC: NEGATIVE MG/DL
BUN SERPL-MCNC: 27 MG/DL (ref 6–23)
CALCIUM SERPL-MCNC: 9.3 MG/DL (ref 8.6–10.3)
CHLORIDE SERPL-SCNC: 107 MMOL/L (ref 98–107)
CHOLEST SERPL-MCNC: 107 MG/DL (ref 0–199)
CHOLESTEROL/HDL RATIO: 1.9
CO2 SERPL-SCNC: 25 MMOL/L (ref 21–32)
COLOR UR: YELLOW
CREAT SERPL-MCNC: 0.97 MG/DL (ref 0.5–1.05)
EGFRCR SERPLBLD CKD-EPI 2021: 61 ML/MIN/1.73M*2
ERYTHROCYTE [DISTWIDTH] IN BLOOD BY AUTOMATED COUNT: 15.4 % (ref 11.5–14.5)
GLUCOSE SERPL-MCNC: 111 MG/DL (ref 74–99)
GLUCOSE UR STRIP.AUTO-MCNC: NORMAL MG/DL
HCT VFR BLD AUTO: 38.5 % (ref 36–46)
HDLC SERPL-MCNC: 56.5 MG/DL
HGB BLD-MCNC: 11.6 G/DL (ref 12–16)
INR PPP: 1.2 (ref 0.9–1.1)
KETONES UR STRIP.AUTO-MCNC: NEGATIVE MG/DL
LDLC SERPL CALC-MCNC: 34 MG/DL
LEUKOCYTE ESTERASE UR QL STRIP.AUTO: NEGATIVE
MCH RBC QN AUTO: 28.2 PG (ref 26–34)
MCHC RBC AUTO-ENTMCNC: 30.1 G/DL (ref 32–36)
MCV RBC AUTO: 94 FL (ref 80–100)
NITRITE UR QL STRIP.AUTO: NEGATIVE
NON HDL CHOLESTEROL: 51 MG/DL (ref 0–149)
NRBC BLD-RTO: 0 /100 WBCS (ref 0–0)
PH UR STRIP.AUTO: 7 [PH]
PLATELET # BLD AUTO: 73 X10*3/UL (ref 150–450)
POTASSIUM SERPL-SCNC: 3.8 MMOL/L (ref 3.5–5.3)
PROT SERPL-MCNC: 6.3 G/DL (ref 6.4–8.2)
PROT UR STRIP.AUTO-MCNC: NEGATIVE MG/DL
PROTHROMBIN TIME: 13.5 SECONDS (ref 9.8–12.8)
RBC # BLD AUTO: 4.11 X10*6/UL (ref 4–5.2)
RBC # UR STRIP.AUTO: NEGATIVE /UL
SODIUM SERPL-SCNC: 139 MMOL/L (ref 136–145)
SP GR UR STRIP.AUTO: 1.02
TRIGL SERPL-MCNC: 81 MG/DL (ref 0–149)
UROBILINOGEN UR STRIP.AUTO-MCNC: ABNORMAL MG/DL
VLDL: 16 MG/DL (ref 0–40)
WBC # BLD AUTO: 3.3 X10*3/UL (ref 4.4–11.3)

## 2024-12-12 PROCEDURE — 82570 ASSAY OF URINE CREATININE: CPT

## 2024-12-12 PROCEDURE — 1158F ADVNC CARE PLAN TLK DOCD: CPT | Performed by: FAMILY MEDICINE

## 2024-12-12 PROCEDURE — 1123F ACP DISCUSS/DSCN MKR DOCD: CPT | Performed by: FAMILY MEDICINE

## 2024-12-12 PROCEDURE — 99214 OFFICE O/P EST MOD 30 MIN: CPT | Performed by: FAMILY MEDICINE

## 2024-12-12 PROCEDURE — 82043 UR ALBUMIN QUANTITATIVE: CPT

## 2024-12-12 PROCEDURE — 3074F SYST BP LT 130 MM HG: CPT | Performed by: FAMILY MEDICINE

## 2024-12-12 PROCEDURE — 36415 COLL VENOUS BLD VENIPUNCTURE: CPT

## 2024-12-12 PROCEDURE — 1159F MED LIST DOCD IN RCRD: CPT | Performed by: FAMILY MEDICINE

## 2024-12-12 PROCEDURE — 1160F RVW MEDS BY RX/DR IN RCRD: CPT | Performed by: FAMILY MEDICINE

## 2024-12-12 PROCEDURE — 1036F TOBACCO NON-USER: CPT | Performed by: FAMILY MEDICINE

## 2024-12-12 PROCEDURE — 1126F AMNT PAIN NOTED NONE PRSNT: CPT | Performed by: FAMILY MEDICINE

## 2024-12-12 PROCEDURE — 3078F DIAST BP <80 MM HG: CPT | Performed by: FAMILY MEDICINE

## 2024-12-12 RX ORDER — TRIAMTERENE AND HYDROCHLOROTHIAZIDE 75; 50 MG/1; MG/1
1 TABLET ORAL
Qty: 90 TABLET | Refills: 1 | Status: SHIPPED | OUTPATIENT
Start: 2024-12-12 | End: 2025-06-10

## 2024-12-12 RX ORDER — POTASSIUM CHLORIDE 750 MG/1
10 TABLET, FILM COATED, EXTENDED RELEASE ORAL 2 TIMES DAILY
Qty: 180 TABLET | Refills: 1 | Status: SHIPPED | OUTPATIENT
Start: 2024-12-12 | End: 2025-06-10

## 2024-12-12 ASSESSMENT — PATIENT HEALTH QUESTIONNAIRE - PHQ9
SUM OF ALL RESPONSES TO PHQ9 QUESTIONS 1 AND 2: 0
1. LITTLE INTEREST OR PLEASURE IN DOING THINGS: NOT AT ALL
2. FEELING DOWN, DEPRESSED OR HOPELESS: NOT AT ALL

## 2024-12-12 ASSESSMENT — PAIN SCALES - GENERAL: PAINLEVEL_OUTOF10: 0-NO PAIN

## 2024-12-13 LAB
CREAT UR-MCNC: 87.1 MG/DL (ref 20–320)
MICROALBUMIN UR-MCNC: <7 MG/L
MICROALBUMIN/CREAT UR: NORMAL MG/G{CREAT}

## 2024-12-27 ENCOUNTER — HOSPITAL ENCOUNTER (OUTPATIENT)
Dept: RADIOLOGY | Facility: HOSPITAL | Age: 74
Discharge: HOME | End: 2024-12-27
Payer: MEDICARE

## 2024-12-27 DIAGNOSIS — K74.60 HEPATIC CIRRHOSIS, UNSPECIFIED HEPATIC CIRRHOSIS TYPE, UNSPECIFIED WHETHER ASCITES PRESENT (MULTI): ICD-10-CM

## 2024-12-27 PROCEDURE — 76705 ECHO EXAM OF ABDOMEN: CPT

## 2025-01-06 ENCOUNTER — HOSPITAL ENCOUNTER (OUTPATIENT)
Dept: RADIOLOGY | Facility: HOSPITAL | Age: 75
Discharge: HOME | End: 2025-01-06
Payer: MEDICARE

## 2025-01-06 DIAGNOSIS — C7B.09: ICD-10-CM

## 2025-01-06 PROCEDURE — 71250 CT THORAX DX C-: CPT | Performed by: RADIOLOGY

## 2025-01-06 PROCEDURE — 71250 CT THORAX DX C-: CPT

## 2025-01-09 ENCOUNTER — OFFICE VISIT (OUTPATIENT)
Dept: HEMATOLOGY/ONCOLOGY | Facility: CLINIC | Age: 75
End: 2025-01-09
Payer: MEDICARE

## 2025-01-09 ENCOUNTER — APPOINTMENT (OUTPATIENT)
Dept: HEMATOLOGY/ONCOLOGY | Facility: CLINIC | Age: 75
End: 2025-01-09
Payer: MEDICARE

## 2025-01-09 VITALS
HEART RATE: 73 BPM | OXYGEN SATURATION: 95 % | SYSTOLIC BLOOD PRESSURE: 153 MMHG | DIASTOLIC BLOOD PRESSURE: 67 MMHG | TEMPERATURE: 98.2 F | RESPIRATION RATE: 17 BRPM | WEIGHT: 209.77 LBS | BODY MASS INDEX: 34.91 KG/M2

## 2025-01-09 DIAGNOSIS — C7B.09: Primary | ICD-10-CM

## 2025-01-09 PROCEDURE — 99213 OFFICE O/P EST LOW 20 MIN: CPT | Performed by: NURSE PRACTITIONER

## 2025-01-09 ASSESSMENT — PAIN SCALES - GENERAL: PAINLEVEL_OUTOF10: 0-NO PAIN

## 2025-01-09 NOTE — PROGRESS NOTES
Patient here today for follow up and to review recent CT.     Fatigue: she's been more tired over this winter  PO intake: adequate  Weight: stable  N/V/D/C: denies    Medications and pharmacy preference reviewed with patient.

## 2025-01-09 NOTE — PROGRESS NOTES
Subjective:   Patient Name: Jessi Hallman    : 1950     MRN: [id number]     Age: 74 y.o.     Gender: female  Referring Provider: ZENON    CC: Management of 1.5 cm nodule in the right upper lobe is unchanged and 1.2 cm nodule in the left lower lobe is unchanged.     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  The lung nodules appear stable in size.  No new lung lesions noted.    Presenting History: At time of initial presentation a 74 y.o. female, was referred to the Hematology/Oncology Clinic by Dr. Whittaker for an opinion concerning the patient's well differentiated neuroendocrine carcinoma right upper lobe with Ki-67 less than 5%.  A CT-guided lung biopsy performed on 2021 revealed the above diagnosis.  Bilateral lung nodules have been noted on previous CAT scans as well as PET scan.  The patient has been asymptomatic and has not required treatment to date.  The lung nodules have been quite indolent overall.     PMHx: Basal cell carcinoma, cirrhosis of liver, leukopenia, neuroendocrine carcinoma of lung, Thrombocytopenia: Iron deficiency anemia, HTN, HLD, splenomegaly, diverticulosis   PSHx; cataract (left)  FHx: No history of hematologic disorders, grandpa liver cancer, mom ovarian cancer  SHx: Former smoker, denies alcohol Use , no illicit drug use; retired      Treatment Summary:  - N/A      Interval History (2025):    Present in clinic for surveillance visit.  Feeling good today.  Breathing feels fine.  Admits SOB climbing stairs.  Resolves with rest.  Dry cough, her norm.  Occasional minimal epistaxis.  She denies any fevers, chills or night sweats. No nausea or vomiting. No constipation or diarrhea. No urinary symptoms. No rash. No neuropathy.     Review of Systems:  A review of systems has been completed and are negative for complaints except what is stated in the HPI and/or past medical history        Medical History:   Past  Medical History:   Diagnosis Date    Albuminuria 06/28/2024    BCC (basal cell carcinoma)     Cytopenia     heme/onc    HTN (hypertension)     Hyperlipidemia     Localized osteoarthritis of left knee     Metastatic malignant carcinoid tumor to lung (Multi)     [facility]; observation yearly; stable since 2017    Osteopenia     Portal hypertension (Multi)     [facility]    Primary biliary cirrhosis (Multi)     [facility]    Splenomegaly     [facility]; heme-onc    Thyroid nodule     Shayla: benign bx's x 2       Surgical History:   Past Surgical History:   Procedure Laterality Date    CATARACT EXTRACTION Left 10/2022    COLONOSCOPY  06/2021    + polyps/nelida 5 yrs; [facility]    CT GUIDED PERCUTANEOUS BIOPSY LUNG  05/10/2021    + cancerous/carcinoid  [facility]    ESOPHAGOGASTRODUODENOSCOPY  06/2021    eso varices; portal htn gastropathy; [facility]    URETHRAL SLING N/A 05/31/2024    retropubic midurethral; andvantage fit blue    US GUIDED PERCUTANEOUS PLACEMENT  10/26/2018    US GUIDED PERCUTANEOUS PLACEMENT [facility]    VAGINAL PROLAPSE REPAIR N/A 05/31/2024    Lefort colpocleisis and posterior repair/perineorrhaphy       Family History:  Family History   Problem Relation Name Age of Onset    Ovarian cancer Mother      Hypertension Mother      Coronary artery disease Father      Hypertension Father      Hypertension Sister      No Known Problems Sister      Hypertension Brother         Allergies:  No Known Allergies    Meds (Current):    Current Outpatient Medications:     acetaminophen (Tylenol) 325 mg tablet, Take 2 tablets (650 mg) by mouth every 6 hours if needed for mild pain (1 - 3)., Disp: , Rfl:     cholecalciferol (Vitamin D3) 25 MCG (1000 UT) capsule, 1 capsule (25 mcg) once every 24 hours., Disp: , Rfl:     potassium chloride CR 10 mEq ER tablet, Take 1 tablet (10 mEq) by mouth 2 times a day., Disp: 180 tablet, Rfl: 1    triamterene-hydrochlorothiazid (Maxzide) 75-50 mg tablet, Take 1 tablet by mouth once daily in the  morning. Take before meals., Disp: 90 tablet, Rfl: 1    ursodiol (Actigall) 500 mg tablet, Take 1 tablet (500 mg) by mouth 2 times a day., Disp: , Rfl:     vit A/vit C/vit E/zinc/copper (ICAPS AREDS ORAL), Take 1 tablet by mouth 2 times a day., Disp: , Rfl:         Family History: No Family History items are recorded in the problem list.     Social History:   Social Substance History:  · Smoking Status former smoker (1)   · Alcohol Use denies   · Drug Use denies   · Additional History      Social history:  No history of blood transfusions or hepatitis. Employment-  retired .  Family History:  No history of hematologic disorders  noted. Grandpa- liver cancer and mom- ? ovarian cancer.  Former smoker, quit in , in her 50s. Has never drank much ETOH. No RDA.   FMH, mother had uterine cancer, GF cancer. 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   Previously worked at a [employer], desk job in scheduling. No exposure to CT/RT/Toxins.    (1)  Performance Status (ECOG):  1     ECOG Definition  0 Fully active; no performance restrictions.  1 Strenuous physical activity restricted; fully ambulatory and able to carry out light work.  2 Capable of all self-care but unable to carry out any work activities. Up and about >50% of waking hours.  3 Capable of only limited self-care; confined to bed or chair >50% of waking hours.  4 Completely disabled; cannot carry out any self-care; totally confined to bed or chair.    Exam:  /67   Pulse 73   Temp 36.8 °C (98.2 °F) (Temporal)   Resp 17   Wt 95.1 kg (209 lb 12.3 oz)   SpO2 95%   BMI 34.91 kg/m²     Wt Readings from Last 5 Encounters:   25 95.1 kg (209 lb 12.3 oz)   24 92.4 kg (203 lb 12.8 oz)   24 91.2 kg (201 lb)   09/10/24 93.4 kg (206 lb)   24 92.5 kg (204 lb)     Physical Exam:  ECO chronic low back/knee pain  Pain: chronic joint/skeletal  Constitutional: Well developed, awake/alert/oriented x3, no  distress, alert and cooperative  Eyes: PER. sclera anicteric  ENMT: Oral mucosa moist, no lesions or thrush identified  Respiratory/Thorax: Breathing is non-labored. Lungs are clear to auscultation bilaterally. No adventitious breath sounds  Cardiovascular: S1-S2. Regular rate and rhythm. No murmurs, rubs, or gallops appreciated  Gastrointestinal: Abdomen soft nontender, nondistended, normal active bowel sounds.  Musculoskeletal: ROM intact, no joint swelling, normal strength  Extremities: normal extremities, no cyanosis, no edema, no clubbing  Lymphatics: no palpable lymphadenopathy   Skin: no rash  Neurologic: alert and oriented x3. Nonfocal exam. No myoclonus  Psychological: Pleasant, appropriate and easily engaged         Labs:  12/12/2024  WBC 3.3, hemoglobin 11.6, hematocrit 38.5 plt 73    Imaging:  CT Chest 1/6/2025  IMPRESSION:  Stable scattered lung nodules measuring up to 1.5 cm in the right upper lobe. Stable nonspecific mild patchy ground-glass opacities as  well as areas of scarring/atelectasis. No new pulmonary nodule or mass. No new areas of consolidation.     Assessment/Plan:   74 y.o. female with PMHX of HTN, autoimmune hepatitis, HSM, and ITP, as well as well differentiated NET of the RUL who presents for a follow up. She had biopsy proven well differentiated NET 04/2021. Serial scan showed no increase in size of the RUL mass. Cu 64 Dotatate scan did only revealed localized disease without any distant mets. WIll discuss her case at  regarding management next Tuesday,.     The patient's records and imaging have been reviewed in detail.  MD discussed with the patient the natural history of their disease at length.  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  The lung nodules appear stable in size but there is no direct comparison.  No new lung lesions noted. Cu 68   Dotatate scan did not show any distant mets.      - Interval Imaging:  CT Chest 1/6/2025 as above     # Clinical Trials:  None currently.     # Access: Peripheral veins.    # Thrombocytopenia/Spenomegaly:  chronic, no gross s/sx's bleeding.  Following with Heme. Likely in the setting of ITP    # Pain: No acute pain.    # Bone Health: No signs of bony disease.     # Psychosocial: Coping well, no acute issues.  Good support from family & friends.  Social work support available.    # GI/CINV: No acute issues.  Eating and voiding well.  Nutrition consult available.    # Smoking: N/A, never smoker.    # Fertility: N/A.      # Auto-Immune liver cirrhosis     # Heme/ESAs: Chronic thrombocytopenia. Stable. Following heme.    # GOC: Patient is aware of observation status.      # Language: English.    # Interdisciplinary Patient/Family Education Record:  Learner:  Patient.  Learning Needs Reviewed:  Treatments, Medications, Disease Process, Diagnostic Tests.  Barriers: None.  Teaching Method: Discussion, Handout(s).  Comprehension:  Verbalized Understanding.  Follow-up Plan:  Return to office as per MD.    # Dispo:   RTC in 6 months with provider visit and scan review.      Robby Spencer, APRN-CNP  [facility]  Thoracic + H&N Medical Oncology     I spent a total of 30+ minutes on the date of the service which included preparing to see the patient, face-to-face patient care, completing clinical documentation, obtaining and / or reviewing separately obtained history, counseling and educating the patient/family/caregiver, ordering medications, tests, or procedures, communicating with other healthcare providers (not separately reported), independently interpreting results, not separately reported, and communicating results to the patient/family/caregiver, Name and date of birth verified.

## 2025-01-10 ENCOUNTER — PATIENT OUTREACH (OUTPATIENT)
Dept: PRIMARY CARE | Facility: CLINIC | Age: 75
End: 2025-01-10
Payer: MEDICARE

## 2025-01-10 NOTE — PROGRESS NOTES
Angelina is doing well at this time. No case management needs. Will close CCM. Angelina will call if anything changes.

## 2025-05-01 NOTE — PROGRESS NOTES
HPI:       Hypertension:          The patient has no issues.          The patients cardiovascular risk factors include:         Cardiac Risk Factors  Age > 45-male, > 55-female:  YES  +1   Smoking:   NO   Sig. family hx of CHD*:  YES  +1   Hypertension:   YES  +1   Diabetes:   NO   HDL < 35:   NO   HDL > 59:   NO   Total: 3     *- Sig. family h/o CHD per NCEP = MI or sudden death at <54yo in   father or other 1st-degree male relative, or <66yo in mother or   other 1st-degree female relative           The patients adherence to the treatment regimen is Excellent         Responses to the medications has been Excellent    Hyperlipidemia:   The patient does not use medications that may worsen dyslipidemias (corticosteroids, progestins, anabolic steroids, diuretics, beta-blockers, amiodarone, cyclosporine, olanzapine). The patient exercises intermittently.  The patient is not known to have coexisting coronary artery disease.      MEDICAL HISTORY:   Past Medical History:   Diagnosis Date    Albuminuria 06/28/2024    BCC (basal cell carcinoma)     Cytopenia     heme/onc    Esophageal varices     HTN (hypertension)     Hyperlipidemia     Localized osteoarthritis of left knee     Metastatic malignant carcinoid tumor to lung     On license of [facility]; observation yearly; stable since 2017    Osteopenia     Portal hypertension (Multi)     [facility]    Primary biliary cirrhosis     [facility]    Splenomegaly     [facility]; heme-onc    Thyroid nodule     Shayla: benign bx's x 2     MEDICATIONS:   Current Outpatient Medications   Medication Sig Dispense Refill    acetaminophen (Tylenol) 325 mg tablet Take 2 tablets (650 mg) by mouth every 6 hours if needed for mild pain (1 - 3).      cholecalciferol (Vitamin D3) 25 MCG (1000 UT) capsule 1 capsule (25 mcg) once every 24 hours.      ursodiol (Actigall) 500 mg tablet Take 1 tablet (500 mg) by mouth 2 times a day.      vit A/vit C/vit E/zinc/copper (ICAPS AREDS ORAL) Take 1 tablet by mouth 2 times a day.       potassium chloride CR 10 mEq ER tablet Take 1 tablet (10 mEq) by mouth 2 times a day. 180 tablet 1    triamterene-hydrochlorothiazid (Maxzide) 75-50 mg tablet Take 1 tablet by mouth once daily in the morning. Take before meals. 90 tablet 1     No current facility-administered medications for this visit.     ALLERGIES: No Known Allergies  FAMILY HISTORY:   Family History   Problem Relation Name Age of Onset    Ovarian cancer Mother      Hypertension Mother      Coronary artery disease Father      Hypertension Father      Hypertension Sister      No Known Problems Sister      Hypertension Brother       SOCIAL HISTORY:   Social History     Tobacco Use    Smoking status: Former     Average packs/day: 0.5 packs/day for 40.0 years (20.0 ttl pk-yrs)     Types: Cigarettes     Start date: 1973     Passive exposure: Past    Smokeless tobacco: Never   Vaping Use    Vaping status: Never Used   Substance Use Topics    Alcohol use: Never    Drug use: Never         ROS:      CONSTITUTION:  alert and oriented     OPHTHALMOLOGY:          no Change in vision.         CARDIOLOGY:          no Chest pain.  no Dyspnea on exertion.  no Shortness of breath.         ENDOCRINOLOGY:          no Excessive thirst.  no Excessive urination.  no Fatigue.  no Skin changes.  no Weight gain.  no Weight loss.         SKIN:          no Healing problems.         NEUROLOGY:          no Loss of sensation in specific body area.  no Burning pain in feet.  no Peripheral neuropathy.  no Tingling/numbness.    LABS: due  Lab Results   Component Value Date    GLUCOSE 111 (H) 12/12/2024    CALCIUM 9.3 12/12/2024     12/12/2024    K 3.8 12/12/2024    CO2 25 12/12/2024     12/12/2024    BUN 27 (H) 12/12/2024    CREATININE 0.97 12/12/2024      Lab Results   Component Value Date    CHOL 107 12/12/2024    CHOL 116 05/10/2023    CHOL 109 (L) 10/24/2022     Lab Results   Component Value Date    HDL 56.5 12/12/2024    HDL 58.4 05/10/2023    HDL 56 10/24/2022  "    Lab Results   Component Value Date    LDLCALC 34 12/12/2024    LDLCALC 30 (L) 10/24/2022    LDLCALC 40 (L) 10/14/2021     Lab Results   Component Value Date    TRIG 81 12/12/2024    TRIG 91 05/10/2023    TRIG 117 10/24/2022     No components found for: \"CHOLHDL\"   Latest Reference Range & Units 12/12/24 11:17   Albumin, Urine Random Not established mg/L <7.0   Creatinine, Urine Random 20.0 - 320.0 mg/dL 87.1   Albumin/Creatinine Ratio  COMMENT ONLY   EGFR >60 mL/min/1.73m*2 61     VITAL SIGNS:  /78   Pulse 81   Wt 93.5 kg (206 lb 3.2 oz)   SpO2 99%   BMI 34.31 kg/m²     General Appearance: awake, alert, oriented, in no acute distress  Lungs: Normal expansion.  Clear to auscultation.  No rales, rhonchi, or wheezing.  Heart: Heart sounds are normal.  Regular rate and rhythm without murmur, gallop or rub.  Extremities: Extremities warm to touch, pink, with no edema.  Neurologic: Alert and oriented x 3, gait normal., reflexes normal and symmetric, strength and  sensation grossly normal    Jessi was seen today for hypertension.  Diagnoses and all orders for this visit:  Primary hypertension (Primary)  -     Follow Up In Primary Care - Established  -     Follow Up In Primary Care - Established; Future  -     potassium chloride CR 10 mEq ER tablet; Take 1 tablet (10 mEq) by mouth 2 times a day.  -     triamterene-hydrochlorothiazid (Maxzide) 75-50 mg tablet; Take 1 tablet by mouth once daily in the morning. Take before meals.  Mixed hyperlipidemia  Comments:  diet/exercise                 "

## 2025-05-29 ENCOUNTER — OFFICE VISIT (OUTPATIENT)
Dept: PRIMARY CARE | Facility: CLINIC | Age: 75
End: 2025-05-29
Payer: MEDICARE

## 2025-05-29 VITALS
SYSTOLIC BLOOD PRESSURE: 138 MMHG | DIASTOLIC BLOOD PRESSURE: 78 MMHG | HEART RATE: 81 BPM | WEIGHT: 206.2 LBS | BODY MASS INDEX: 34.31 KG/M2 | OXYGEN SATURATION: 99 %

## 2025-05-29 DIAGNOSIS — E78.2 MIXED HYPERLIPIDEMIA: ICD-10-CM

## 2025-05-29 DIAGNOSIS — I10 PRIMARY HYPERTENSION: Primary | ICD-10-CM

## 2025-05-29 PROCEDURE — 99214 OFFICE O/P EST MOD 30 MIN: CPT | Performed by: FAMILY MEDICINE

## 2025-05-29 PROCEDURE — 1159F MED LIST DOCD IN RCRD: CPT | Performed by: FAMILY MEDICINE

## 2025-05-29 PROCEDURE — 1036F TOBACCO NON-USER: CPT | Performed by: FAMILY MEDICINE

## 2025-05-29 PROCEDURE — 3075F SYST BP GE 130 - 139MM HG: CPT | Performed by: FAMILY MEDICINE

## 2025-05-29 PROCEDURE — 3078F DIAST BP <80 MM HG: CPT | Performed by: FAMILY MEDICINE

## 2025-05-29 PROCEDURE — 1126F AMNT PAIN NOTED NONE PRSNT: CPT | Performed by: FAMILY MEDICINE

## 2025-05-29 RX ORDER — TRIAMTERENE AND HYDROCHLOROTHIAZIDE 75; 50 MG/1; MG/1
1 TABLET ORAL
Qty: 90 TABLET | Refills: 1 | Status: SHIPPED | OUTPATIENT
Start: 2025-05-29 | End: 2025-11-25

## 2025-05-29 RX ORDER — POTASSIUM CHLORIDE 750 MG/1
10 TABLET, FILM COATED, EXTENDED RELEASE ORAL 2 TIMES DAILY
Qty: 180 TABLET | Refills: 1 | Status: SHIPPED | OUTPATIENT
Start: 2025-05-29 | End: 2025-11-25

## 2025-05-29 ASSESSMENT — PATIENT HEALTH QUESTIONNAIRE - PHQ9
2. FEELING DOWN, DEPRESSED OR HOPELESS: NOT AT ALL
1. LITTLE INTEREST OR PLEASURE IN DOING THINGS: NOT AT ALL
SUM OF ALL RESPONSES TO PHQ9 QUESTIONS 1 AND 2: 0

## 2025-05-29 ASSESSMENT — PAIN SCALES - GENERAL: PAINLEVEL_OUTOF10: 0-NO PAIN

## 2025-06-10 ENCOUNTER — APPOINTMENT (OUTPATIENT)
Dept: OBSTETRICS AND GYNECOLOGY | Facility: CLINIC | Age: 75
End: 2025-06-10
Payer: MEDICARE

## 2025-06-10 ENCOUNTER — OFFICE VISIT (OUTPATIENT)
Dept: PRIMARY CARE | Facility: CLINIC | Age: 75
End: 2025-06-10
Payer: MEDICARE

## 2025-06-10 VITALS — DIASTOLIC BLOOD PRESSURE: 64 MMHG | BODY MASS INDEX: 33.95 KG/M2 | WEIGHT: 204 LBS | SYSTOLIC BLOOD PRESSURE: 114 MMHG

## 2025-06-10 VITALS
OXYGEN SATURATION: 96 % | WEIGHT: 203.2 LBS | DIASTOLIC BLOOD PRESSURE: 68 MMHG | BODY MASS INDEX: 33.81 KG/M2 | HEART RATE: 79 BPM | SYSTOLIC BLOOD PRESSURE: 134 MMHG

## 2025-06-10 DIAGNOSIS — Z98.890 HISTORY OF COLPOCLEISIS: Primary | ICD-10-CM

## 2025-06-10 DIAGNOSIS — Z87.42 HISTORY OF COLPOCLEISIS: Primary | ICD-10-CM

## 2025-06-10 DIAGNOSIS — Z98.890 HISTORY OF SUBURETHRAL SLING PROCEDURE: ICD-10-CM

## 2025-06-10 DIAGNOSIS — K11.20 PAROTIDITIS: Primary | ICD-10-CM

## 2025-06-10 DIAGNOSIS — R60.0 SUBMANDIBULAR GLAND SWELLING: ICD-10-CM

## 2025-06-10 DIAGNOSIS — N32.81 OAB (OVERACTIVE BLADDER): ICD-10-CM

## 2025-06-10 DIAGNOSIS — K11.1 ENLARGED PAROTID GLAND: ICD-10-CM

## 2025-06-10 DIAGNOSIS — R22.0 FACIAL SWELLING: ICD-10-CM

## 2025-06-10 PROBLEM — I85.00 ESOPHAGEAL VARICES WITHOUT BLEEDING (MULTI): Status: RESOLVED | Noted: 2023-07-27 | Resolved: 2025-06-10

## 2025-06-10 PROBLEM — K76.6 PORTAL HYPERTENSION (MULTI): Status: RESOLVED | Noted: 2024-01-02 | Resolved: 2025-06-10

## 2025-06-10 PROCEDURE — G2211 COMPLEX E/M VISIT ADD ON: HCPCS | Performed by: OBSTETRICS & GYNECOLOGY

## 2025-06-10 PROCEDURE — 99214 OFFICE O/P EST MOD 30 MIN: CPT | Performed by: OBSTETRICS & GYNECOLOGY

## 2025-06-10 PROCEDURE — 3075F SYST BP GE 130 - 139MM HG: CPT | Performed by: FAMILY MEDICINE

## 2025-06-10 PROCEDURE — 1126F AMNT PAIN NOTED NONE PRSNT: CPT | Performed by: OBSTETRICS & GYNECOLOGY

## 2025-06-10 PROCEDURE — 1125F AMNT PAIN NOTED PAIN PRSNT: CPT | Performed by: FAMILY MEDICINE

## 2025-06-10 PROCEDURE — 1159F MED LIST DOCD IN RCRD: CPT | Performed by: OBSTETRICS & GYNECOLOGY

## 2025-06-10 PROCEDURE — 99213 OFFICE O/P EST LOW 20 MIN: CPT | Performed by: FAMILY MEDICINE

## 2025-06-10 PROCEDURE — 1036F TOBACCO NON-USER: CPT | Performed by: FAMILY MEDICINE

## 2025-06-10 PROCEDURE — 1159F MED LIST DOCD IN RCRD: CPT | Performed by: FAMILY MEDICINE

## 2025-06-10 PROCEDURE — 3074F SYST BP LT 130 MM HG: CPT | Performed by: OBSTETRICS & GYNECOLOGY

## 2025-06-10 PROCEDURE — 3078F DIAST BP <80 MM HG: CPT | Performed by: FAMILY MEDICINE

## 2025-06-10 PROCEDURE — 3078F DIAST BP <80 MM HG: CPT | Performed by: OBSTETRICS & GYNECOLOGY

## 2025-06-10 RX ORDER — AMOXICILLIN 875 MG/1
875 TABLET, COATED ORAL 2 TIMES DAILY
Qty: 20 TABLET | Refills: 0 | Status: SHIPPED | OUTPATIENT
Start: 2025-06-10 | End: 2025-06-20

## 2025-06-10 ASSESSMENT — ENCOUNTER SYMPTOMS
MUSCULOSKELETAL NEGATIVE: 1
CONSTITUTIONAL NEGATIVE: 1
PSYCHIATRIC NEGATIVE: 1
CARDIOVASCULAR NEGATIVE: 1
RESPIRATORY NEGATIVE: 1
GASTROINTESTINAL NEGATIVE: 1
NEUROLOGICAL NEGATIVE: 1
ENDOCRINE NEGATIVE: 1
EYES NEGATIVE: 1

## 2025-06-10 ASSESSMENT — PATIENT HEALTH QUESTIONNAIRE - PHQ9
1. LITTLE INTEREST OR PLEASURE IN DOING THINGS: NOT AT ALL
SUM OF ALL RESPONSES TO PHQ9 QUESTIONS 1 AND 2: 0
2. FEELING DOWN, DEPRESSED OR HOPELESS: NOT AT ALL

## 2025-06-10 ASSESSMENT — PAIN SCALES - GENERAL
PAINLEVEL_OUTOF10: 9
PAINLEVEL_OUTOF10: 0-NO PAIN

## 2025-06-10 NOTE — PROGRESS NOTES
Subjective   Patient ID: Jessi Hallman is a 75 y.o. female who presents for Facial Swelling.    HPI   Pt comes in with left sided facial swelling that started few days ago.  She has had a parotid gland swelling on the right side last year that responded with augmentin.  She had US, ct scan and MRI by ENT that showed nothing malignant or worrisome.  Pt says the swelling is identical to last  years but on the other side.  Tender, warm,    Past Medical History:   Diagnosis Date    Albuminuria 06/28/2024    BCC (basal cell carcinoma)     Cytopenia     heme/onc    Esophageal varices     HTN (hypertension)     Hyperlipidemia     Localized osteoarthritis of left knee     Metastatic malignant carcinoid tumor to lung     [facility]; observation yearly; stable since 2017    Osteopenia     Portal hypertension (Multi)     [facility]    Primary biliary cirrhosis     [facility]    Splenomegaly     [facility]; heme-onc    Thyroid nodule     Shayla: benign bx's x 2     Current Outpatient Medications   Medication Sig Dispense Refill    acetaminophen (Tylenol) 325 mg tablet Take 2 tablets (650 mg) by mouth every 6 hours if needed for mild pain (1 - 3).      cholecalciferol (Vitamin D3) 25 MCG (1000 UT) capsule 1 capsule (25 mcg) once every 24 hours.      potassium chloride CR 10 mEq ER tablet Take 1 tablet (10 mEq) by mouth 2 times a day. 180 tablet 1    triamterene-hydrochlorothiazid (Maxzide) 75-50 mg tablet Take 1 tablet by mouth once daily in the morning. Take before meals. 90 tablet 1    ursodiol (Actigall) 500 mg tablet Take 1 tablet (500 mg) by mouth 2 times a day.      vit A/vit C/vit E/zinc/copper (ICAPS AREDS ORAL) Take 1 tablet by mouth 2 times a day.      amoxicillin (Amoxil) 875 mg tablet Take 1 tablet (875 mg) by mouth 2 times a day for 10 days. 20 tablet 0     No current facility-administered medications for this visit.       Review of Systems see hpi    Objective   /68   Pulse 79   Wt 92.2 kg (203 lb 3.2 oz)   SpO2 96%   BMI  33.81 kg/m²     Physical Exam  ENT:      Jaw: Tenderness and swelling present. No trismus.      Comments: moderate  swelling and tenderness under left mandible in area of submandibular salivary gland.  Pt also tender over left TMJ area with swelling and enlargement of the left parotid gland; + erythema over left cheek/parotid area.     Mouth/Throat:      Mouth: Mucous membranes are moist.      Pharynx: Oropharynx is clear.      Comments: No pus noted at salivary opening of parotid gland    Assessment/Plan   Assessment & Plan  Parotiditis  Ice/heat whatever makes it feel better; tylenol/nsaids prn pain. Eat/drink sour things  Orders:    amoxicillin (Amoxil) 875 mg tablet; Take 1 tablet (875 mg) by mouth 2 times a day for 10 days.    Enlarged parotid gland    Orders:    amoxicillin (Amoxil) 875 mg tablet; Take 1 tablet (875 mg) by mouth 2 times a day for 10 days.    Submandibular gland swelling    Orders:    amoxicillin (Amoxil) 875 mg tablet; Take 1 tablet (875 mg) by mouth 2 times a day for 10 days.

## 2025-06-10 NOTE — PROGRESS NOTES
[facility] Department of Urogynecology   Nichol Jackson MD, MPH   [phone]    ASSESSMENT AND PLAN:   75 year old female with FI due to rectal prolapse and rare urinary urgency. She has a hx of uterovaginal prolapse and LUMA that is now resolved s/p LeFort colpocleisis, MT/perineorrhaphy, midurethral sling, and cystoscopy on 5/31/2024. Comorbidities include:     Diagnoses:  #1 Uterovaginal prolapse (resolved)   #2 Stress urinary incontinence (resolved)  #3 Urinary urgency     Plan:  1. Uterovaginal prolapse, LUMA  - 5/31/2024 surgery: LeFort colpocleisis, MT/perineorrhaphy, midurethral sling, and cystoscopy.   - No recurrence of POP or LUMA per patient symptoms. There is no mesh from the TVT visualized or palpated upon exam today.      2. Facial swelling  - We discussed that this appears to be parotid gland swelling and to follow up with her PCP regarding this if it does not resolve.     3. Urinary urgency  - Patient prefers to continue wearing pads to manage urinary urgency conservatively at this time and declines interest in starting an OAB medication.     Follow up in 1 year with Dr. Nichol Jackson.     Scribe Attestation  By signing my name below, I, Ana Bradford, attest that this documentation has been prepared under the direction and in the presence of Nichol Jackson MD MPH on 06/10/2025 at 5:06 PM.     Problem List Items Addressed This Visit    None  Visit Diagnoses         History of colpocleisis    -  Primary      History of suburethral sling procedure          Facial swelling          OAB (overactive bladder)               I spent a total of 30 minutes in face to face and non face to face time.   I Dr. Jackson, personally performed the services described in the documentation as scribed in my presence and confirm it is both complete and accurate.  Nichol Jackson MD, MPH, FACOG       Nichol Jackson MD, MPH, FACOG     Established    HISTORY OF PRESENT ILLNESS:   75 year old female  presenting for an annual pelvic exam s/p LeFort colpocleisis, FL/perineorrhaphy, midurethral sling, and cystoscopy on 5/31/2024     Record Review:   - 9/10/2024 postop note RE: Resolved POP and LUMA symptoms s/p surgery in May 2024. Healed well upon exam. Declines interest in OAB medications and will continue conservative measures to manage UUI.   - 7/9/2024 Dr. Nichol Jackson postop note Re: Doing well from a postoperative and healing standpoint. Released from all restrictions. Referred her to CRS for rectal prolapse management.   - 6/11/2024 Dr. Jackson note Re: Doing well postoperatively and healing well. No return of POP or LUMA symptoms. For urinary urgency, we will give this x1 month time to continue healing process and if not improved will start OAB medication. Rectal prolapse, she was bleeding during bowel movements and no VB noted. She discontinued using MiraLAX.   - 5/31/2024: Lefort colpocleisis, FL, MUS, cystoscopy. She has thrombocytopenia and had postop bleeding, so she was kept overnight. Patient was seen the next day and was doing well.     Prolapse Symptoms:  - Denies any recurrence of a vaginal bulge.      Urinary Symptoms:   - UUI has resolved but does endorse persistent urgency.   - Endorses very rare LUMA that occurs with a once in a while cough.   - Wears pads for insurance purposes related to her urinary urgency.     Bowel Symptoms:  - Endorses FI due to having rectal prolapse.   - Planning to see GI next week due to autoimmune disease and NAFLD/cirrhosis despite not drinking alcohol.        Past Medical History:     Medical History[1]       Past Surgical History:     Surgical History[2]      Medications:     Prior to Admission medications    Medication Sig Start Date End Date Taking? Authorizing Provider   acetaminophen (Tylenol) 325 mg tablet Take 2 tablets (650 mg) by mouth every 6 hours if needed for mild pain (1 - 3).    Historical Provider, MD   amoxicillin (Amoxil) 875 mg tablet Take 1  tablet (875 mg) by mouth 2 times a day for 10 days. 6/10/25 6/20/25  Angeles Whittaker MD   cholecalciferol (Vitamin D3) 25 MCG (1000 UT) capsule 1 capsule (25 mcg) once every 24 hours.    Historical Provider, MD   potassium chloride CR 10 mEq ER tablet Take 1 tablet (10 mEq) by mouth 2 times a day. 5/29/25 11/25/25  Angeles Whittaker MD   triamterene-hydrochlorothiazid (Maxzide) 75-50 mg tablet Take 1 tablet by mouth once daily in the morning. Take before meals. 5/29/25 11/25/25  Angeles Whittaker MD   ursodiol (Actigall) 500 mg tablet Take 1 tablet (500 mg) by mouth 2 times a day.    Historical Provider, MD   vit A/vit C/vit E/zinc/copper (ICAPS AREDS ORAL) Take 1 tablet by mouth 2 times a day.    Historical Provider, MD MCALLISTER  Review of Systems   Constitutional: Negative.    HENT: Negative.     Eyes: Negative.    Respiratory: Negative.     Cardiovascular: Negative.    Gastrointestinal: Negative.    Endocrine: Negative.    Genitourinary:  Positive for urgency.   Musculoskeletal: Negative.    Neurological: Negative.    Psychiatric/Behavioral: Negative.            PHYSICAL EXAM:    /64   Wt 92.5 kg (204 lb)   BMI 33.95 kg/m²   No LMP recorded. Patient is postmenopausal.    Declines chaperone for physical exam.      Well developed, well nourished, in no apparent distress.   Neurologic/Psychiatric:  Awake, Alert and Oriented times 3.  Affect normal.     GENITAL/URINARY:     External Genitalia:  The patient has normal appearing external genitalia, normal skenes and bartholins glands, and a normal hair distribution.  Her vulva is without lesions, erythema or discharge.  It is non-tender with appropriate sensation.     Urethral Meatus:  Size normal, Location normal, Lesions absent, Prolapse absent.    Urethra:  Fullness absent, Masses absent. Negative CST in the supine position.     Bladder:  Fullness absent, Masses absent, Tenderness absent.    Vagina:  General appearance normal, Estrogen effect normal, Discharge  "absent, Lesions absent. There is no mesh erosion from the midurethral sling visualized or palpated.     Anus/Perineum:  Lesions absent and masses absent. Perineum is well-approximated.     Stress urinary incontinence not demonstrable.       Physical Exam  Genitourinary:      Bladder and urethral meatus normal.      No lesions in the vagina.      Genitourinary Comments: There is about 2 cm of rectal prolapse noticeable.       Right Labia: No tenderness or lesions.     Left Labia: No tenderness or lesions.     No vaginal discharge, bleeding, ulceration or mesh exposure.      No vaginal prolapse present.     No vaginal atrophy present.     No urethral tenderness, mass or stress urinary incontinence with cough stress test present.   POP-Q measurements were:      Aa: -1, Ba: -1, C:     gH: 4, pB: 4, TVL: 3     Ap: -2, Bp: -2, D:     Pelvic exam was performed with patient normal support.         She does not have myofascial tenderness on exam.   Her highest pain score on exam is 1       Rectal exam: There is about 2 cm of rectal prolapse noticeable by visualization.       Data and DIAGNOSTIC STUDIES REVIEWED   Imaging  No results found.    Cardiology, Vascular, and Other Imaging  No other imaging results found for the past 7 days     No results found for: \"URINECULTURE\", \"UAMICCOMM\"   Lab Results   Component Value Date    GLUCOSE 111 (H) 12/12/2024    CALCIUM 9.3 12/12/2024     12/12/2024    K 3.8 12/12/2024    CO2 25 12/12/2024     12/12/2024    BUN 27 (H) 12/12/2024    CREATININE 0.97 12/12/2024     Lab Results   Component Value Date    WBC 3.3 (L) 12/12/2024    HGB 11.6 (L) 12/12/2024    HCT 38.5 12/12/2024    MCV 94 12/12/2024    PLT 73 (L) 12/12/2024             [1]   Past Medical History:  Diagnosis Date    Albuminuria 06/28/2024    BCC (basal cell carcinoma)     Cytopenia     heme/onc    Esophageal varices     HTN (hypertension)     Hyperlipidemia     Localized osteoarthritis of left knee     Metastatic " malignant carcinoid tumor to lung      Alexis; observation yearly; stable since 2017    Osteopenia     Portal hypertension (Multi)     Ocampo    Primary biliary cirrhosis     Ocampo    Splenomegaly     Ocampo; heme-onc    Thyroid nodule     Shayla: benign bx's x 2   [2]   Past Surgical History:  Procedure Laterality Date    CATARACT EXTRACTION Left 10/2022    COLONOSCOPY  06/2021    + polyps/nelida 5 yrs; Ocampo    CT GUIDED PERCUTANEOUS BIOPSY LUNG  05/10/2021    + cancerous/carcinoid  Jared/Pete    ESOPHAGOGASTRODUODENOSCOPY  06/2021    eso varices; portal htn gastropathy; Ocampo    URETHRAL SLING N/A 05/31/2024    retropubic midurethral; andvantage fit blue    US GUIDED PERCUTANEOUS PLACEMENT  10/26/2018    US GUIDED PERCUTANEOUS PLACEMENT [facility]    VAGINAL PROLAPSE REPAIR N/A 05/31/2024    Lefort colpocleisis and posterior repair/perineorrhaphy

## 2025-06-19 ENCOUNTER — HOSPITAL ENCOUNTER (OUTPATIENT)
Dept: RADIOLOGY | Facility: HOSPITAL | Age: 75
Discharge: HOME | End: 2025-06-19
Payer: MEDICARE

## 2025-06-19 DIAGNOSIS — K74.3 PRIMARY BILIARY CIRRHOSIS: ICD-10-CM

## 2025-06-19 DIAGNOSIS — K74.69 OTHER CIRRHOSIS OF LIVER: ICD-10-CM

## 2025-06-19 PROCEDURE — 76705 ECHO EXAM OF ABDOMEN: CPT

## 2025-07-07 ENCOUNTER — HOSPITAL ENCOUNTER (OUTPATIENT)
Dept: RADIOLOGY | Facility: HOSPITAL | Age: 75
End: 2025-07-07
Payer: MEDICARE

## 2025-07-08 ENCOUNTER — HOSPITAL ENCOUNTER (OUTPATIENT)
Dept: RADIOLOGY | Facility: HOSPITAL | Age: 75
Discharge: HOME | End: 2025-07-08
Payer: MEDICARE

## 2025-07-08 DIAGNOSIS — C7B.09: ICD-10-CM

## 2025-07-08 PROCEDURE — 71250 CT THORAX DX C-: CPT | Performed by: RADIOLOGY

## 2025-07-08 PROCEDURE — 71250 CT THORAX DX C-: CPT

## 2025-07-14 ENCOUNTER — OFFICE VISIT (OUTPATIENT)
Dept: HEMATOLOGY/ONCOLOGY | Facility: CLINIC | Age: 75
End: 2025-07-14
Payer: MEDICARE

## 2025-07-14 VITALS
WEIGHT: 198.41 LBS | SYSTOLIC BLOOD PRESSURE: 178 MMHG | OXYGEN SATURATION: 94 % | BODY MASS INDEX: 33.02 KG/M2 | DIASTOLIC BLOOD PRESSURE: 75 MMHG | HEART RATE: 87 BPM | TEMPERATURE: 97.2 F | RESPIRATION RATE: 18 BRPM

## 2025-07-14 DIAGNOSIS — C7B.09: ICD-10-CM

## 2025-07-14 PROCEDURE — 3078F DIAST BP <80 MM HG: CPT | Performed by: NURSE PRACTITIONER

## 2025-07-14 PROCEDURE — 99214 OFFICE O/P EST MOD 30 MIN: CPT | Performed by: NURSE PRACTITIONER

## 2025-07-14 PROCEDURE — 1160F RVW MEDS BY RX/DR IN RCRD: CPT | Performed by: NURSE PRACTITIONER

## 2025-07-14 PROCEDURE — 3077F SYST BP >= 140 MM HG: CPT | Performed by: NURSE PRACTITIONER

## 2025-07-14 PROCEDURE — 1125F AMNT PAIN NOTED PAIN PRSNT: CPT | Performed by: NURSE PRACTITIONER

## 2025-07-14 PROCEDURE — 1159F MED LIST DOCD IN RCRD: CPT | Performed by: NURSE PRACTITIONER

## 2025-07-14 ASSESSMENT — PAIN SCALES - GENERAL: PAINLEVEL_OUTOF10: 9

## 2025-07-14 NOTE — PROGRESS NOTES
Patient here for follow up visit neuroendocrine carcinoma RUL, patient of Dr Espinoza.Currently under surveillance. CT CAP completed 7/7/25 for review today.    No concerns or complaints noted at this time.   Patient here alone  Some mild SOB  Dry cough  Denies neuropathy.  Denies N/V/D/C    Medications and Allergies reviewed and reconciled this visit.    Follow up per MD request.    Patient reports availability and use of mychart, Reviewed this is a good place to communicate with the team as well as review labs and upcoming orders.      No barriers to education noted, patient agrees to current plan and verbalized understanding using teach back method.

## 2025-07-14 NOTE — PROGRESS NOTES
Subjective:   Patient Name: Jessi Hallman    : 1950     MRN: [id number]     Age: 74 y.o.     Gender: female  Referring Provider: ZENON    CC: Management of 1.5 cm nodule in the right upper lobe is unchanged and 1.2 cm nodule in the left lower lobe is unchanged.     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  The lung nodules appear stable in size.  No new lung lesions noted.    Presenting History: At time of initial presentation a 74 y.o. female, was referred to the Hematology/Oncology Clinic by Dr. Whittaker for an opinion concerning the patient's well differentiated neuroendocrine carcinoma right upper lobe with Ki-67 less than 5%.  A CT-guided lung biopsy performed on 2021 revealed the above diagnosis.  Bilateral lung nodules have been noted on previous CAT scans as well as PET scan.  The patient has been asymptomatic and has not required treatment to date.  The lung nodules have been quite indolent overall.     PMHx: Basal cell carcinoma, cirrhosis of liver, leukopenia, neuroendocrine carcinoma of lung, Thrombocytopenia: Iron deficiency anemia, HTN, HLD, splenomegaly, diverticulosis   PSHx; cataract (left)  FHx: No history of hematologic disorders, grandpa liver cancer, mom ovarian cancer  SHx: Former smoker, denies alcohol Use , no illicit drug use; retired      Treatment Summary:  - N/A      Interval History (2025):    Present in clinic for surveillance visit.  Feeling good today.  Breathing feels fine.  Admits SOB climbing stairs.  Resolves with rest.  Right sided knee pain limits her mobility more then her respiratory status. Dry cough, her norm. She denies any fevers, chills or night sweats. No nausea or vomiting. No constipation or diarrhea. No urinary symptoms. No rash. No neuropathy.     Review of Systems:  A review of systems has been completed and are negative for complaints except what is stated in the HPI and/or past  medical history        Medical History:   Past Medical History:   Diagnosis Date    Albuminuria 06/28/2024    BCC (basal cell carcinoma)     Cytopenia     heme/onc    HTN (hypertension)     Hyperlipidemia     Localized osteoarthritis of left knee     Metastatic malignant carcinoid tumor to lung (Multi)     JANINA Espinoza; observation yearly; stable since 2017    Osteopenia     Portal hypertension (Multi)     Ocampo    Primary biliary cirrhosis (Multi)     Ocampo    Splenomegaly     Ocampo; heme-onc    Thyroid nodule     Shayla: benign bx's x 2       Surgical History:   Past Surgical History:   Procedure Laterality Date    CATARACT EXTRACTION Left 10/2022    COLONOSCOPY  06/2021    + polyps/nelida 5 yrs; Ocampo    CT GUIDED PERCUTANEOUS BIOPSY LUNG  05/10/2021    + cancerous/carcinoid  Jared/Pete    ESOPHAGOGASTRODUODENOSCOPY  06/2021    eso varices; portal htn gastropathy; Ocampo    URETHRAL SLING N/A 05/31/2024    retropubic midurethral; andvantage fit blue    US GUIDED PERCUTANEOUS PLACEMENT  10/26/2018    US GUIDED PERCUTANEOUS PLACEMENT [facility]    VAGINAL PROLAPSE REPAIR N/A 05/31/2024    Lefort colpocleisis and posterior repair/perineorrhaphy       Family History:  Family History   Problem Relation Name Age of Onset    Ovarian cancer Mother      Hypertension Mother      Coronary artery disease Father      Hypertension Father      Hypertension Sister      No Known Problems Sister      Hypertension Brother         Allergies:  No Known Allergies    Meds (Current):  Current Medications[1]        Family History: No Family History items are recorded in the problem list.     Social History:   Social Substance History:  · Smoking Status former smoker (1)   · Alcohol Use denies   · Drug Use denies   · Additional History      Social History:  No history of blood transfusions or hepatitis. Employment-  retired .    Former smoker, quit in 1990, in her 50s. Has never drank much ETOH.    Family History:  No  history of hematologic disorders  noted. Grandpa- liver cancer. Mother had uterine cancer. 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     Performance Status (ECOG):  1   ECOG Definition  0 Fully active; no performance restrictions.  1 Strenuous physical activity restricted; fully ambulatory and able to carry out light work.  2 Capable of all self-care but unable to carry out any work activities. Up and about >50% of waking hours.  3 Capable of only limited self-care; confined to bed or chair >50% of waking hours.  4 Completely disabled; cannot carry out any self-care; totally confined to bed or chair.    Exam:  /75 (BP Location: Right arm, Patient Position: Sitting, BP Cuff Size: Adult long)   Pulse 87   Temp 36.2 °C (97.2 °F) (Temporal)   Resp 18   Wt 90 kg (198 lb 6.6 oz)   SpO2 94%   BMI 33.02 kg/m²     Wt Readings from Last 5 Encounters:   25 90 kg (198 lb 6.6 oz)   06/10/25 92.2 kg (203 lb 3.2 oz)   06/10/25 92.5 kg (204 lb)   25 93.5 kg (206 lb 3.2 oz)   25 95.1 kg (209 lb 12.3 oz)       Physical Exam:  ECO chronic low back/knee pain  Pain: chronic joint/skeletal  Constitutional: Well developed, awake/alert/oriented x3, no distress, alert and cooperative  Eyes: PER. sclera anicteric  ENMT: Oral mucosa moist, no lesions or thrush identified  Respiratory/Thorax: Breathing is non-labored. Lungs are clear to auscultation bilaterally. No adventitious breath sounds  Cardiovascular: S1-S2. Regular rate and rhythm. No murmurs, rubs, or gallops appreciated  Gastrointestinal: Abdomen soft nontender, nondistended, normal active bowel sounds.  Musculoskeletal: ROM intact, no joint swelling, normal strength  Extremities: normal extremities, no cyanosis, no edema, no clubbing  Lymphatics: no palpable lymphadenopathy   Skin: no rash  Neurologic: Nonfocal exam. No myoclonus  Psychological: Pleasant, appropriate and easily engaged         Labs:  No new labs      Imaging:  CT Chest 7/8/2025  IMPRESSION:  1.  Redemonstration of several solid nodules bilaterally measuring up to 15 mm in the right upper lobe, unchanged in size and number from the prior  2. Worsening of bilateral diffuse ground-glass airspace disease. Underlying inflammatory/infectious process and reactive airway disease in the differential. Short-term follow-up in 3 months advised that documented improvement.    Assessment/Plan:   74 y.o. female with PMHX of HTN, autoimmune hepatitis, HSM, and ITP, as well as well differentiated NET of the RUL who presents for a follow up. She had biopsy proven well differentiated NET 04/2021. Serial scan showed no increase in size of the RUL mass. Cu 64 Dotatate scan did only revealed localized disease without any distant mets. WIll discuss her case at  regarding management next Tuesday,.     The patient's records and imaging have been reviewed in detail.  MD discussed with the patient the natural history of their disease at length.  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  The lung nodules appear stable in size but there is no direct comparison.  No new lung lesions noted. Cu 68  Dotatate scan did not show any distant mets.      - Interval Imaging:  CT Chest 7/7/2025 as above     # Clinical Trials:  None currently.     # Access: Peripheral veins.    # Thrombocytopenia/Spenomegaly:  chronic, no gross s/sx's bleeding.  Following with Heme. Likely in the setting of ITP    # Pain: No acute pain.    # Bone Health: No signs of bony disease.     # Psychosocial: Coping well, no acute issues.  Good support from family & friends.  Social work support available.    # GI/CINV: No acute issues.  Eating and voiding well.  Nutrition consult available.    # Smoking: N/A, never smoker.    # Fertility: N/A.      # Auto-Immune liver cirrhosis     # Heme/ESAs:  Chronic thrombocytopenia. Stable. Following heme.    # GOC: Patient is aware of observation status.      # Language: English.    # Interdisciplinary Patient/Family Education Record:  Learner:  Patient.  Learning Needs Reviewed:  Treatments, Medications, Disease Process, Diagnostic Tests.  Barriers: None.  Teaching Method: Discussion, Handout(s).  Comprehension:  Verbalized Understanding.  Follow-up Plan:  Return to office as per MD.    # Dispo:   RTC in 3 months with provider visit and scan review.      Robby Spencer, APRN-CNP  [facility]  Thoracic + H&N Medical Oncology     I spent a total of 30+ minutes on the date of the service which included preparing to see the patient, face-to-face patient care, completing clinical documentation, obtaining and / or reviewing separately obtained history, counseling and educating the patient/family/caregiver, ordering medications, tests, or procedures, communicating with other healthcare providers (not separately reported), independently interpreting results, not separately reported, and communicating results to the patient/family/caregiver, Name and date of birth verified.              [1]   Current Outpatient Medications:     acetaminophen (Tylenol) 325 mg tablet, Take 2 tablets (650 mg) by mouth every 6 hours if needed for mild pain (1 - 3)., Disp: , Rfl:     cholecalciferol (Vitamin D3) 25 MCG (1000 UT) capsule, 1 capsule (25 mcg) once every 24 hours., Disp: , Rfl:     potassium chloride CR 10 mEq ER tablet, Take 1 tablet (10 mEq) by mouth 2 times a day., Disp: 180 tablet, Rfl: 1    triamterene-hydrochlorothiazid (Maxzide) 75-50 mg tablet, Take 1 tablet by mouth once daily in the morning. Take before meals., Disp: 90 tablet, Rfl: 1    ursodiol (Actigall) 500 mg tablet, Take 1 tablet (500 mg) by mouth 2 times a day., Disp: , Rfl:     vit A/vit C/vit E/zinc/copper (ICAPS AREDS ORAL), Take 1 tablet by mouth 2 times a day., Disp: , Rfl:

## 2025-08-07 ENCOUNTER — TELEPHONE (OUTPATIENT)
Dept: CARE COORDINATION | Facility: CLINIC | Age: 75
End: 2025-08-07
Payer: MEDICARE

## 2025-08-07 NOTE — PROGRESS NOTES
Date: 08/07/25    Dear Jessi Hallman    Our records indicate that you are due for the following appointment or test with your Primary Care Provider: Annual Wellness Visit      Please call our office at your earliest convenience. We can assist you with scheduling an appointment or test.  If you have already scheduled an appointment or have completed testing, please disregard this letter.    Sincerely,    Jyothi Sumner, Patient Navigator      Office Phone: [phone]  Patient Navigator: [phone]

## 2025-08-27 ENCOUNTER — HOSPITAL ENCOUNTER (OUTPATIENT)
Dept: RADIOLOGY | Facility: HOSPITAL | Age: 75
Discharge: HOME | End: 2025-08-27
Payer: MEDICARE

## 2025-08-27 ENCOUNTER — APPOINTMENT (OUTPATIENT)
Dept: ORTHOPEDIC SURGERY | Facility: CLINIC | Age: 75
End: 2025-08-27
Payer: MEDICARE

## 2025-08-27 DIAGNOSIS — M25.561 RIGHT KNEE PAIN, UNSPECIFIED CHRONICITY: ICD-10-CM

## 2025-08-27 DIAGNOSIS — M17.0 PRIMARY OSTEOARTHRITIS OF BOTH KNEES: Primary | ICD-10-CM

## 2025-08-27 PROCEDURE — 73562 X-RAY EXAM OF KNEE 3: CPT | Mod: RIGHT SIDE | Performed by: STUDENT IN AN ORGANIZED HEALTH CARE EDUCATION/TRAINING PROGRAM

## 2025-08-27 PROCEDURE — 1159F MED LIST DOCD IN RCRD: CPT | Performed by: ORTHOPAEDIC SURGERY

## 2025-08-27 PROCEDURE — 1036F TOBACCO NON-USER: CPT | Performed by: ORTHOPAEDIC SURGERY

## 2025-08-27 PROCEDURE — 73564 X-RAY EXAM KNEE 4 OR MORE: CPT | Mod: RT

## 2025-08-27 PROCEDURE — 20610 DRAIN/INJ JOINT/BURSA W/O US: CPT | Performed by: ORTHOPAEDIC SURGERY

## 2025-08-27 PROCEDURE — 73564 X-RAY EXAM KNEE 4 OR MORE: CPT | Mod: RIGHT SIDE | Performed by: STUDENT IN AN ORGANIZED HEALTH CARE EDUCATION/TRAINING PROGRAM

## 2025-08-27 PROCEDURE — 99204 OFFICE O/P NEW MOD 45 MIN: CPT | Performed by: ORTHOPAEDIC SURGERY

## 2025-08-27 PROCEDURE — 1125F AMNT PAIN NOTED PAIN PRSNT: CPT | Performed by: ORTHOPAEDIC SURGERY

## 2025-08-27 PROCEDURE — 73562 X-RAY EXAM OF KNEE 3: CPT | Mod: LT

## 2025-08-27 RX ORDER — TRIAMCINOLONE ACETONIDE 40 MG/ML
2.5 INJECTION, SUSPENSION INTRA-ARTICULAR; INTRAMUSCULAR
Status: COMPLETED | OUTPATIENT
Start: 2025-08-27 | End: 2025-08-27

## 2025-08-27 RX ADMIN — TRIAMCINOLONE ACETONIDE 2.5 MG: 40 INJECTION, SUSPENSION INTRA-ARTICULAR; INTRAMUSCULAR at 12:51

## 2025-08-27 ASSESSMENT — PAIN SCALES - GENERAL: PAINLEVEL_OUTOF10: 8

## 2025-08-27 ASSESSMENT — PAIN - FUNCTIONAL ASSESSMENT: PAIN_FUNCTIONAL_ASSESSMENT: 0-10

## 2025-10-23 ENCOUNTER — APPOINTMENT (OUTPATIENT)
Dept: HEMATOLOGY/ONCOLOGY | Facility: CLINIC | Age: 75
End: 2025-10-23
Payer: MEDICARE

## 2026-06-09 ENCOUNTER — APPOINTMENT (OUTPATIENT)
Dept: OBSTETRICS AND GYNECOLOGY | Facility: CLINIC | Age: 76
End: 2026-06-09
Payer: MEDICARE

## (undated) DEVICE — SYRINGE, 20 CC, LUER LOCK

## (undated) DEVICE — SPONGE, LAP, XRAY DECT, 4IN X 18IN, W/MASTER DMT, STERILE

## (undated) DEVICE — IRRIGATION SET, CYSTOSCOPY, REGULATING CLAMP, STRAIGHT, 81 IN

## (undated) DEVICE — DRAPE PACK, LAVH, W/ATTACHED LEGGINGS, W/POUCH, 100 X 114 IN, LF, STERILE

## (undated) DEVICE — COUNTER, NEEDLE, FOAM BLOCK, POP-N-COUNT, W/BLADEGUARD, W/ADHESIVE 40 COUNT, RED

## (undated) DEVICE — Device

## (undated) DEVICE — SYRINGE, LUER LOCK, 12ML

## (undated) DEVICE — SUTURE, VICRYL, 4-0, 18 IN, PS2, UNDYED

## (undated) DEVICE — NEEDLE, SPINAL, 22 G X 3.5 IN, BLACK HUB

## (undated) DEVICE — CAUTERY, PENCIL, PUSH BUTTON, SMOKE EVAC, 70MM

## (undated) DEVICE — SUTURE, CTD, VICRYL, 2-0, UND, BR, CT-2

## (undated) DEVICE — SUTURE, PDS II, 2-0, 27 IN, CT2, VIOLET

## (undated) DEVICE — HEMOSTAT, ARISTA, ABSORBABLE, 3 GRAMS

## (undated) DEVICE — SUTURE, VICRYL, 3-0, 27 IN, SH, VIOLET

## (undated) DEVICE — TOWEL, SURGICAL, NEURO, O/R, 16 X 26, BLUE, STERILE

## (undated) DEVICE — ELECTRODE, ELECTROSURGICAL, BLADE, INSULATED, ENT/IMA, STERILE

## (undated) DEVICE — TUBING, SUCTION, NON-CONDUCTIVE, W/CONNECT,.25 IN X 12 FT, STERILE, LF

## (undated) DEVICE — SUTURE, CTD, VICRYL 2-0, VIL, BR, CR/SH

## (undated) DEVICE — TIP, SUCTION, YANKAUER, W/O VENT, FLEXIBLE, OPEN TIP, HIGH CAPACITY

## (undated) DEVICE — TRAY, FOLEY, LUBRI-SIL, 16FR, COMPLETE CARE W/STATLOCK

## (undated) DEVICE — DRAPE, INSTRUMENT, W/POUCH, STERI DRAPE, 7 X 11 IN, DISPOSABLE, STERILE